# Patient Record
Sex: MALE | Race: WHITE | URBAN - METROPOLITAN AREA
[De-identification: names, ages, dates, MRNs, and addresses within clinical notes are randomized per-mention and may not be internally consistent; named-entity substitution may affect disease eponyms.]

---

## 2022-12-24 ENCOUNTER — APPOINTMENT (OUTPATIENT)
Dept: GENERAL RADIOLOGY | Age: 52
End: 2022-12-24
Payer: COMMERCIAL

## 2022-12-24 ENCOUNTER — HOSPITAL ENCOUNTER (EMERGENCY)
Age: 52
Discharge: HOME | End: 2022-12-24
Attending: EMERGENCY MEDICINE
Payer: COMMERCIAL

## 2022-12-24 VITALS
SYSTOLIC BLOOD PRESSURE: 126 MMHG | HEIGHT: 73 IN | DIASTOLIC BLOOD PRESSURE: 64 MMHG | OXYGEN SATURATION: 94 % | BODY MASS INDEX: 25.18 KG/M2 | WEIGHT: 190 LBS | TEMPERATURE: 98.8 F | HEART RATE: 80 BPM | RESPIRATION RATE: 14 BRPM

## 2022-12-24 DIAGNOSIS — R19.7 NAUSEA, VOMITING AND DIARRHEA: ICD-10-CM

## 2022-12-24 DIAGNOSIS — R11.2 NAUSEA, VOMITING AND DIARRHEA: ICD-10-CM

## 2022-12-24 DIAGNOSIS — E86.0 MILD DEHYDRATION: ICD-10-CM

## 2022-12-24 DIAGNOSIS — R55 SYNCOPE AND COLLAPSE: ICD-10-CM

## 2022-12-24 DIAGNOSIS — J10.1 INFLUENZA A: Primary | ICD-10-CM

## 2022-12-24 LAB
ALBUMIN SERPL-MCNC: 3.9 G/DL (ref 3.5–5.2)
ALP BLD-CCNC: 63 U/L (ref 40–129)
ALT SERPL-CCNC: 34 U/L (ref 0–40)
ANION GAP SERPL CALCULATED.3IONS-SCNC: 11 MMOL/L (ref 7–16)
AST SERPL-CCNC: 47 U/L (ref 0–39)
BASOPHILS ABSOLUTE: 0.01 E9/L (ref 0–0.2)
BASOPHILS RELATIVE PERCENT: 0.1 % (ref 0–2)
BILIRUB SERPL-MCNC: 0.4 MG/DL (ref 0–1.2)
BUN BLDV-MCNC: 14 MG/DL (ref 6–20)
CALCIUM SERPL-MCNC: 8.6 MG/DL (ref 8.6–10.2)
CHLORIDE BLD-SCNC: 101 MMOL/L (ref 98–107)
CO2: 25 MMOL/L (ref 22–29)
CREAT SERPL-MCNC: 1.2 MG/DL (ref 0.7–1.2)
EOSINOPHILS ABSOLUTE: 0 E9/L (ref 0.05–0.5)
EOSINOPHILS RELATIVE PERCENT: 0 % (ref 0–6)
GFR SERPL CREATININE-BSD FRML MDRD: >60 ML/MIN/1.73
GLUCOSE BLD-MCNC: 197 MG/DL (ref 74–99)
HCT VFR BLD CALC: 40.4 % (ref 37–54)
HEMOGLOBIN: 13.8 G/DL (ref 12.5–16.5)
IMMATURE GRANULOCYTES #: 0.03 E9/L
IMMATURE GRANULOCYTES %: 0.4 % (ref 0–5)
INFLUENZA A BY PCR: DETECTED
INFLUENZA B BY PCR: NOT DETECTED
LACTIC ACID: 1.6 MMOL/L (ref 0.5–2.2)
LIPASE: 48 U/L (ref 13–60)
LYMPHOCYTES ABSOLUTE: 1.01 E9/L (ref 1.5–4)
LYMPHOCYTES RELATIVE PERCENT: 13.2 % (ref 20–42)
MAGNESIUM: 1.6 MG/DL (ref 1.6–2.6)
MCH RBC QN AUTO: 31.2 PG (ref 26–35)
MCHC RBC AUTO-ENTMCNC: 34.2 % (ref 32–34.5)
MCV RBC AUTO: 91.2 FL (ref 80–99.9)
MONOCYTES ABSOLUTE: 0.67 E9/L (ref 0.1–0.95)
MONOCYTES RELATIVE PERCENT: 8.7 % (ref 2–12)
NEUTROPHILS ABSOLUTE: 5.95 E9/L (ref 1.8–7.3)
NEUTROPHILS RELATIVE PERCENT: 77.6 % (ref 43–80)
PDW BLD-RTO: 12.1 FL (ref 11.5–15)
PLATELET # BLD: 126 E9/L (ref 130–450)
PMV BLD AUTO: 9.5 FL (ref 7–12)
POTASSIUM SERPL-SCNC: 3.8 MMOL/L (ref 3.5–5)
PRO-BNP: 162 PG/ML (ref 0–125)
RBC # BLD: 4.43 E12/L (ref 3.8–5.8)
SARS-COV-2, NAAT: NOT DETECTED
SODIUM BLD-SCNC: 137 MMOL/L (ref 132–146)
TOTAL PROTEIN: 6.8 G/DL (ref 6.4–8.3)
TROPONIN, HIGH SENSITIVITY: 11 NG/L (ref 0–11)
WBC # BLD: 7.7 E9/L (ref 4.5–11.5)

## 2022-12-24 PROCEDURE — 96374 THER/PROPH/DIAG INJ IV PUSH: CPT

## 2022-12-24 PROCEDURE — 2580000003 HC RX 258: Performed by: STUDENT IN AN ORGANIZED HEALTH CARE EDUCATION/TRAINING PROGRAM

## 2022-12-24 PROCEDURE — 99285 EMERGENCY DEPT VISIT HI MDM: CPT

## 2022-12-24 PROCEDURE — 6360000002 HC RX W HCPCS: Performed by: STUDENT IN AN ORGANIZED HEALTH CARE EDUCATION/TRAINING PROGRAM

## 2022-12-24 PROCEDURE — 85025 COMPLETE CBC W/AUTO DIFF WBC: CPT

## 2022-12-24 PROCEDURE — 71045 X-RAY EXAM CHEST 1 VIEW: CPT

## 2022-12-24 PROCEDURE — 83605 ASSAY OF LACTIC ACID: CPT

## 2022-12-24 PROCEDURE — 96375 TX/PRO/DX INJ NEW DRUG ADDON: CPT

## 2022-12-24 PROCEDURE — 96361 HYDRATE IV INFUSION ADD-ON: CPT

## 2022-12-24 PROCEDURE — 93005 ELECTROCARDIOGRAM TRACING: CPT | Performed by: STUDENT IN AN ORGANIZED HEALTH CARE EDUCATION/TRAINING PROGRAM

## 2022-12-24 PROCEDURE — 6370000000 HC RX 637 (ALT 250 FOR IP): Performed by: STUDENT IN AN ORGANIZED HEALTH CARE EDUCATION/TRAINING PROGRAM

## 2022-12-24 PROCEDURE — 87502 INFLUENZA DNA AMP PROBE: CPT

## 2022-12-24 PROCEDURE — 84484 ASSAY OF TROPONIN QUANT: CPT

## 2022-12-24 PROCEDURE — 87635 SARS-COV-2 COVID-19 AMP PRB: CPT

## 2022-12-24 PROCEDURE — 83690 ASSAY OF LIPASE: CPT

## 2022-12-24 PROCEDURE — 83880 ASSAY OF NATRIURETIC PEPTIDE: CPT

## 2022-12-24 PROCEDURE — 80053 COMPREHEN METABOLIC PANEL: CPT

## 2022-12-24 PROCEDURE — 83735 ASSAY OF MAGNESIUM: CPT

## 2022-12-24 RX ORDER — KETOROLAC TROMETHAMINE 15 MG/ML
15 INJECTION, SOLUTION INTRAMUSCULAR; INTRAVENOUS ONCE
Status: COMPLETED | OUTPATIENT
Start: 2022-12-24 | End: 2022-12-24

## 2022-12-24 RX ORDER — ONDANSETRON 4 MG/1
8 TABLET, ORALLY DISINTEGRATING ORAL 3 TIMES DAILY PRN
Qty: 30 TABLET | Refills: 0 | Status: SHIPPED | OUTPATIENT
Start: 2022-12-24 | End: 2022-12-31

## 2022-12-24 RX ORDER — 0.9 % SODIUM CHLORIDE 0.9 %
1000 INTRAVENOUS SOLUTION INTRAVENOUS ONCE
Status: COMPLETED | OUTPATIENT
Start: 2022-12-24 | End: 2022-12-24

## 2022-12-24 RX ORDER — ONDANSETRON 2 MG/ML
4 INJECTION INTRAMUSCULAR; INTRAVENOUS ONCE
Status: COMPLETED | OUTPATIENT
Start: 2022-12-24 | End: 2022-12-24

## 2022-12-24 RX ORDER — ACETAMINOPHEN 500 MG
1000 TABLET ORAL ONCE
Status: COMPLETED | OUTPATIENT
Start: 2022-12-24 | End: 2022-12-24

## 2022-12-24 RX ORDER — NAPROXEN 500 MG/1
500 TABLET ORAL 2 TIMES DAILY PRN
Qty: 20 TABLET | Refills: 0 | Status: SHIPPED | OUTPATIENT
Start: 2022-12-24 | End: 2023-01-03

## 2022-12-24 RX ADMIN — SODIUM CHLORIDE 1000 ML: 9 INJECTION, SOLUTION INTRAVENOUS at 20:06

## 2022-12-24 RX ADMIN — SODIUM CHLORIDE 1000 ML: 9 INJECTION, SOLUTION INTRAVENOUS at 18:06

## 2022-12-24 RX ADMIN — ACETAMINOPHEN 1000 MG: 500 TABLET ORAL at 18:06

## 2022-12-24 RX ADMIN — ONDANSETRON 4 MG: 2 INJECTION INTRAMUSCULAR; INTRAVENOUS at 18:06

## 2022-12-24 RX ADMIN — KETOROLAC TROMETHAMINE 15 MG: 15 INJECTION, SOLUTION INTRAMUSCULAR; INTRAVENOUS at 19:50

## 2022-12-24 ASSESSMENT — PAIN - FUNCTIONAL ASSESSMENT: PAIN_FUNCTIONAL_ASSESSMENT: NONE - DENIES PAIN

## 2022-12-24 NOTE — ED PROVIDER NOTES
Department of Emergency Medicine   ED Provider Note  Admit Date/RoomTime: 12/24/2022  5:16 PM  ED Room: 08/08          History of Present Illness:  12/24/22, Time: 5:18 PM FREDIS Ferguson is a 47 y.o. male with history of tobacco use presenting to the ED for cough, fevers, diarrhea and nausea/vomiting, beginning 3 days ago. The complaint has been persistent, moderate in severity, and worsened by nothing. Patient states that intermittently for the past 3 weeks he has had viral symptoms, for the past 3 days has had fevers, dry cough, nausea and vomiting and diarrhea. He has had some intermittent dark stools but has been taking Pepto-Bismol. No hematochezia. No hematemesis. Due to the nausea/vomiting he has not been eating or drinking much. He has had subjective fevers, been taking over-the-counter medications for this. He has had some shortness of breath as well, worse with exertion. He came in today because he was getting up from bed, was feeling sweaty and not well, when he stood up to go to the bathroom he had an episode of syncope that was witnessed by his wife. He did hit his head on the washer machine, no seizure-like activity. Not on anticoagulation. No headache or visual changes or nausea/vomiting since the head injury. No neck pain. No abdominal pain. No known ill contacts. No chest pain. No dysuria or hematuria urgency or frequency. Review of Systems:      Pertinent positives and negatives are stated within HPI. 10 point ROS otherwise negative.    --------------------------------------------- PAST HISTORY ---------------------------------------------  Past Medical History:  has no past medical history on file. Past Surgical History:  has no past surgical history on file. Social History:  reports that he has been smoking cigarettes. He has a 55.50 pack-year smoking history. He does not have any smokeless tobacco history on file.     Family History: family history is not on file.     The patients home medications have been reviewed. Allergies: Patient has no known allergies. ---------------------------------------------------PHYSICAL EXAM--------------------------------------    Constitutional/General: Awake and alert, NAD, no labored breathing  Head: Normocephalic and atraumatic  Eyes: EOMI, conjunctiva normal, sclera non icteric  Ears: Normal external ears  Nose: Normal external nose  Mouth: Moist mucous membranes, uvula midline  Neck: Supple, no stridor, no meningeal signs  Respiratory: Lungs clear to auscultation bilaterally, no wheezes, rales, or rhonchi. Not in respiratory distress  Cardiovascular:  Regular rate. Regular rhythm. No murmurs, no gallops, or rubs. 2+ distal pulses. Equal extremity pulses. Chest: No chest wall tenderness or deformity  GI:  Abdomen Soft, Non tender, Non distended. No rebound, guarding, or rigidity. No pulsatile masses. Musculoskeletal: Moves all extremities x 4. Warm and well perfused, no clubbing, cyanosis, or edema. Capillary refill <3 seconds  Integument: skin warm and dry. Neurologic: GCS 15, no focal deficits, alert and responsive, symmetric strength 5/5 in the major muscle groups of upper and lower extremities bilaterally  Psychiatric: Normal Affect      -------------------------------------------------- RESULTS -------------------------------------------------  I have personally reviewed and interpreted all laboratory and imaging results for this patient. Results are listed below.      LABS:  Results for orders placed or performed during the hospital encounter of 12/24/22   COVID-19, Rapid    Specimen: Nasopharyngeal Swab   Result Value Ref Range    SARS-CoV-2, NAAT Not Detected Not Detected   RAPID INFLUENZA A/B ANTIGENS    Specimen: Nasopharyngeal   Result Value Ref Range    Influenza A by PCR DETECTED (A) Not Detected    Influenza B by PCR Not Detected Not Detected   CBC with Auto Differential   Result Value Ref Range    WBC 7.7 4.5 - 11.5 E9/L    RBC 4.43 3.80 - 5.80 E12/L    Hemoglobin 13.8 12.5 - 16.5 g/dL    Hematocrit 40.4 37.0 - 54.0 %    MCV 91.2 80.0 - 99.9 fL    MCH 31.2 26.0 - 35.0 pg    MCHC 34.2 32.0 - 34.5 %    RDW 12.1 11.5 - 15.0 fL    Platelets 366 (L) 233 - 450 E9/L    MPV 9.5 7.0 - 12.0 fL    Neutrophils % 77.6 43.0 - 80.0 %    Immature Granulocytes % 0.4 0.0 - 5.0 %    Lymphocytes % 13.2 (L) 20.0 - 42.0 %    Monocytes % 8.7 2.0 - 12.0 %    Eosinophils % 0.0 0.0 - 6.0 %    Basophils % 0.1 0.0 - 2.0 %    Neutrophils Absolute 5.95 1.80 - 7.30 E9/L    Immature Granulocytes # 0.03 E9/L    Lymphocytes Absolute 1.01 (L) 1.50 - 4.00 E9/L    Monocytes Absolute 0.67 0.10 - 0.95 E9/L    Eosinophils Absolute 0.00 (L) 0.05 - 0.50 E9/L    Basophils Absolute 0.01 0.00 - 0.20 E9/L   Lipase   Result Value Ref Range    Lipase 48 13 - 60 U/L   Magnesium   Result Value Ref Range    Magnesium 1.6 1.6 - 2.6 mg/dL   Lactic Acid   Result Value Ref Range    Lactic Acid 1.6 0.5 - 2.2 mmol/L   Troponin   Result Value Ref Range    Troponin, High Sensitivity 11 0 - 11 ng/L   Brain Natriuretic Peptide   Result Value Ref Range    Pro- (H) 0 - 125 pg/mL   CMP   Result Value Ref Range    Sodium 137 132 - 146 mmol/L    Potassium 3.8 3.5 - 5.0 mmol/L    Chloride 101 98 - 107 mmol/L    CO2 25 22 - 29 mmol/L    Anion Gap 11 7 - 16 mmol/L    Glucose 197 (H) 74 - 99 mg/dL    BUN 14 6 - 20 mg/dL    Creatinine 1.2 0.7 - 1.2 mg/dL    Est, Glom Filt Rate >60 >=60 mL/min/1.73    Calcium 8.6 8.6 - 10.2 mg/dL    Total Protein 6.8 6.4 - 8.3 g/dL    Albumin 3.9 3.5 - 5.2 g/dL    Total Bilirubin 0.4 0.0 - 1.2 mg/dL    Alkaline Phosphatase 63 40 - 129 U/L    ALT 34 0 - 40 U/L    AST 47 (H) 0 - 39 U/L   EKG 12 Lead   Result Value Ref Range    Ventricular Rate 94 BPM    Atrial Rate 94 BPM    P-R Interval 152 ms    QRS Duration 92 ms    Q-T Interval 336 ms    QTc Calculation (Bazett) 420 ms    P Axis 78 degrees    R Axis 83 degrees    T Axis 70 degrees RADIOLOGY:  Imaging Interpreted by Radiologist, initial wet read of imaging interpreted by myself  XR CHEST PORTABLE   Final Result   No acute process. EKG:    See ED Course for EKG documentation        ------------------------- NURSING NOTES AND VITALS REVIEWED ---------------------------   The nursing notes within the ED encounter and vital signs as below have been reviewed by myself. /64   Pulse 80   Temp 98.8 °F (37.1 °C) (Oral)   Resp 14   Ht 6' 1\" (1.854 m)   Wt 190 lb (86.2 kg)   SpO2 94%   BMI 25.07 kg/m²   Oxygen Saturation Interpretation: Normal    The patients available past medical records and past encounters were reviewed, see [facility] for details. ------------------------------ ED COURSE/MEDICAL DECISION MAKING----------------------  Medications   0.9 % sodium chloride bolus (0 mLs IntraVENous Stopped 12/24/22 2006)   ondansetron (ZOFRAN) injection 4 mg (4 mg IntraVENous Given 12/24/22 1806)   acetaminophen (TYLENOL) tablet 1,000 mg (1,000 mg Oral Given 12/24/22 1806)   0.9 % sodium chloride bolus (0 mLs IntraVENous Stopped 12/24/22 2113)   ketorolac (TORADOL) injection 15 mg (15 mg IntraVENous Given 12/24/22 1950)            Medical Decision Making:     ED Course as of 12/25/22 0053   Sat Dec 24, 2022   1825 EKG: This EKG is signed and interpreted by me. Rate: 94  Rhythm: Sinus  Interpretation: no acute changes, no acute ischemic changes  Comparison: no previous EKG   [RH]   2005 Patient maintained O2 sat of 93-94% on room air while ambulating. [RH]      ED Course User Index  [RH] [address], DO       This is a 53-year-old male presenting to the emergency department for lightheadedness, syncopal episode. Patient has also had cough and fever for the last several days. Patient here positive for influenza A. He has had decreased p.o. intake secondary to nausea and vomiting and diarrhea. Suspect mild dehydration.   Patient here with reassuring work-up including normal kidney function electrolytes, no leukocytosis or significant anemia. Patient normotensive and afebrile not tachycardic and nontoxic-appearing. Patient maintained O2 sat on room air while ambulating here in the emergency department. Patient is out of window for benefit from Tamiflu. Chest x-ray with no evidence of pneumonia or acute intrathoracic process. Troponin negative, EKG without acute ischemic changes. Patient treated with IV fluids, IV antiemetics, oral Tylenol for fever as well as IV Toradol for fever and myalgias. Patient with resolution of fever after treatment. Patient with significant improvement in symptoms after treatment with medications. Patient comfortable with plan for discharge home and outpatient follow-up, given prescription for antiemetics and NSAIDs, strict return precautions. See ED COURSE for additional MDM. Labs & imaging reviewed and interpreted, see RESULTS above. Re-Evaluations:           See ED Course above. This patient's ED course included: a personal history and physicial examination, re-evaluation prior to disposition, multiple bedside re-evaluations, IV medications, cardiac monitoring, and continuous pulse oximetry    This patient has improved during their ED course. Consultations:  See ED Course    Counseling: The emergency physician has spoken with the patient and spouse/SO and discussed todays results, in addition to providing specific details for the plan of care and counseling regarding the diagnosis and prognosis. Questions are answered at this time and they are agreeable with the plan.       --------------------------------- IMPRESSION AND DISPOSITION ---------------------------------    IMPRESSION  1. Influenza A    2. Syncope and collapse    3. Nausea, vomiting and diarrhea    4.  Mild dehydration        DISPOSITION  Disposition: Discharge to home  Patient condition is stable    NOTE: This report was transcribed using voice recognition software. Every effort was made to ensure accuracy; however, inadvertent computerized transcription errors may be present. Also please note that patient was seen and examined by attending physician. Plan of care and disposition discussed with attending physician and they were immediately available or present for all procedures performed.        -- Mckenna Fontanez D.O. PGY-3     Emergency Medicine      12/25/2022 12:53 AM         [address] DO  Resident  12/25/22 7622

## 2022-12-25 LAB
EKG ATRIAL RATE: 94 BPM
EKG P AXIS: 78 DEGREES
EKG P-R INTERVAL: 152 MS
EKG Q-T INTERVAL: 336 MS
EKG QRS DURATION: 92 MS
EKG QTC CALCULATION (BAZETT): 420 MS
EKG R AXIS: 83 DEGREES
EKG T AXIS: 70 DEGREES
EKG VENTRICULAR RATE: 94 BPM

## 2022-12-25 PROCEDURE — 93010 ELECTROCARDIOGRAM REPORT: CPT | Performed by: INTERNAL MEDICINE

## 2022-12-25 NOTE — ED NOTES
Patient ambulated on pulse ox. Spo2 remained 94% on room air.  Dr. Jl Hough notified      Rito Colorado RN  12/24/22 2008

## 2022-12-28 ENCOUNTER — APPOINTMENT (OUTPATIENT)
Dept: GENERAL RADIOLOGY | Age: 52
DRG: 871 | End: 2022-12-28
Payer: COMMERCIAL

## 2022-12-28 ENCOUNTER — APPOINTMENT (OUTPATIENT)
Dept: CT IMAGING | Age: 52
DRG: 871 | End: 2022-12-28
Payer: COMMERCIAL

## 2022-12-28 ENCOUNTER — HOSPITAL ENCOUNTER (INPATIENT)
Age: 52
LOS: 8 days | Discharge: HOME | DRG: 871 | End: 2023-01-05
Attending: EMERGENCY MEDICINE | Admitting: INTERNAL MEDICINE
Payer: COMMERCIAL

## 2022-12-28 DIAGNOSIS — J18.9 COMMUNITY ACQUIRED PNEUMONIA, UNSPECIFIED LATERALITY: ICD-10-CM

## 2022-12-28 DIAGNOSIS — J11.1 INFLUENZA WITH RESPIRATORY MANIFESTATION OTHER THAN PNEUMONIA: ICD-10-CM

## 2022-12-28 DIAGNOSIS — J44.1 COPD EXACERBATION (HCC): ICD-10-CM

## 2022-12-28 DIAGNOSIS — R06.03 ACUTE RESPIRATORY DISTRESS: ICD-10-CM

## 2022-12-28 DIAGNOSIS — J96.01 ACUTE RESPIRATORY FAILURE WITH HYPOXIA (HCC): Primary | ICD-10-CM

## 2022-12-28 LAB
ALBUMIN SERPL-MCNC: 2.9 G/DL (ref 3.5–5.2)
ALBUMIN SERPL-MCNC: 2.9 G/DL (ref 3.5–5.2)
ALP BLD-CCNC: 44 U/L (ref 40–129)
ALP BLD-CCNC: 44 U/L (ref 40–129)
ALT SERPL-CCNC: 16 U/L (ref 0–40)
ALT SERPL-CCNC: 16 U/L (ref 0–40)
ANION GAP SERPL CALCULATED.3IONS-SCNC: 18 MMOL/L (ref 7–16)
ANION GAP SERPL CALCULATED.3IONS-SCNC: 18 MMOL/L (ref 7–16)
AST SERPL-CCNC: 55 U/L (ref 0–39)
AST SERPL-CCNC: 55 U/L (ref 0–39)
B.E.: -1.3 MMOL/L (ref -3–3)
B.E.: -1.3 MMOL/L (ref -3–3)
BACTERIA: ABNORMAL /HPF
BACTERIA: ABNORMAL /HPF
BASOPHILS ABSOLUTE: 0 E9/L (ref 0–0.2)
BASOPHILS ABSOLUTE: 0 E9/L (ref 0–0.2)
BASOPHILS RELATIVE PERCENT: 0 % (ref 0–2)
BASOPHILS RELATIVE PERCENT: 0 % (ref 0–2)
BILIRUB SERPL-MCNC: 0.4 MG/DL (ref 0–1.2)
BILIRUB SERPL-MCNC: 0.4 MG/DL (ref 0–1.2)
BILIRUBIN URINE: NEGATIVE
BILIRUBIN URINE: NEGATIVE
BLOOD, URINE: NEGATIVE
BLOOD, URINE: NEGATIVE
BUN BLDV-MCNC: 19 MG/DL (ref 6–20)
BUN BLDV-MCNC: 19 MG/DL (ref 6–20)
BURR CELLS: ABNORMAL
BURR CELLS: ABNORMAL
CALCIUM SERPL-MCNC: 8.3 MG/DL (ref 8.6–10.2)
CALCIUM SERPL-MCNC: 8.3 MG/DL (ref 8.6–10.2)
CHLORIDE BLD-SCNC: 97 MMOL/L (ref 98–107)
CHLORIDE BLD-SCNC: 97 MMOL/L (ref 98–107)
CHP ED QC CHECK: NORMAL
CHP ED QC CHECK: NORMAL
CLARITY: CLEAR
CLARITY: CLEAR
CO2: 19 MMOL/L (ref 22–29)
CO2: 19 MMOL/L (ref 22–29)
COHB: 1.6 % (ref 0–1.5)
COHB: 1.6 % (ref 0–1.5)
COLOR: YELLOW
COLOR: YELLOW
CREAT SERPL-MCNC: 0.9 MG/DL (ref 0.7–1.2)
CREAT SERPL-MCNC: 0.9 MG/DL (ref 0.7–1.2)
CRITICAL: ABNORMAL
CRITICAL: ABNORMAL
DATE ANALYZED: ABNORMAL
DATE ANALYZED: ABNORMAL
DATE OF COLLECTION: ABNORMAL
DATE OF COLLECTION: ABNORMAL
EOSINOPHILS ABSOLUTE: 0 E9/L (ref 0.05–0.5)
EOSINOPHILS ABSOLUTE: 0 E9/L (ref 0.05–0.5)
EOSINOPHILS RELATIVE PERCENT: 0 % (ref 0–6)
EOSINOPHILS RELATIVE PERCENT: 0 % (ref 0–6)
EPITHELIAL CELLS, UA: ABNORMAL /HPF
EPITHELIAL CELLS, UA: ABNORMAL /HPF
GFR SERPL CREATININE-BSD FRML MDRD: >60 ML/MIN/1.73
GFR SERPL CREATININE-BSD FRML MDRD: >60 ML/MIN/1.73
GLUCOSE BLD-MCNC: 149 MG/DL (ref 74–99)
GLUCOSE BLD-MCNC: 149 MG/DL (ref 74–99)
GLUCOSE BLD-MCNC: 372 MG/DL
GLUCOSE BLD-MCNC: 372 MG/DL
GLUCOSE URINE: >=1000 MG/DL
GLUCOSE URINE: >=1000 MG/DL
HCO3: 20.7 MMOL/L (ref 22–26)
HCO3: 20.7 MMOL/L (ref 22–26)
HCT VFR BLD CALC: 36.6 % (ref 37–54)
HCT VFR BLD CALC: 36.6 % (ref 37–54)
HEMOGLOBIN: 13.3 G/DL (ref 12.5–16.5)
HEMOGLOBIN: 13.3 G/DL (ref 12.5–16.5)
HHB: 24 % (ref 0–5)
HHB: 24 % (ref 0–5)
INFLUENZA A: DETECTED
INFLUENZA A: DETECTED
INFLUENZA B: NOT DETECTED
INFLUENZA B: NOT DETECTED
INR BLD: 1.2
INR BLD: 1.2
KETONES, URINE: 15 MG/DL
KETONES, URINE: 15 MG/DL
LAB: ABNORMAL
LAB: ABNORMAL
LACTIC ACID, SEPSIS: 2.4 MMOL/L (ref 0.5–1.9)
LACTIC ACID, SEPSIS: 2.4 MMOL/L (ref 0.5–1.9)
LACTIC ACID, SEPSIS: 2.8 MMOL/L (ref 0.5–1.9)
LACTIC ACID, SEPSIS: 2.8 MMOL/L (ref 0.5–1.9)
LEUKOCYTE ESTERASE, URINE: NEGATIVE
LEUKOCYTE ESTERASE, URINE: NEGATIVE
LIPASE: 24 U/L (ref 13–60)
LIPASE: 24 U/L (ref 13–60)
LYMPHOCYTES ABSOLUTE: 0.24 E9/L (ref 1.5–4)
LYMPHOCYTES ABSOLUTE: 0.24 E9/L (ref 1.5–4)
LYMPHOCYTES RELATIVE PERCENT: 3.5 % (ref 20–42)
LYMPHOCYTES RELATIVE PERCENT: 3.5 % (ref 20–42)
Lab: ABNORMAL
Lab: ABNORMAL
MCH RBC QN AUTO: 31.5 PG (ref 26–35)
MCH RBC QN AUTO: 31.5 PG (ref 26–35)
MCHC RBC AUTO-ENTMCNC: 36.3 % (ref 32–34.5)
MCHC RBC AUTO-ENTMCNC: 36.3 % (ref 32–34.5)
MCV RBC AUTO: 86.7 FL (ref 80–99.9)
MCV RBC AUTO: 86.7 FL (ref 80–99.9)
METER GLUCOSE: 372 MG/DL (ref 74–99)
METER GLUCOSE: 372 MG/DL (ref 74–99)
METHB: 0.3 % (ref 0–1.5)
METHB: 0.3 % (ref 0–1.5)
MODE: ABNORMAL
MODE: ABNORMAL
MONOCYTES ABSOLUTE: 0.3 E9/L (ref 0.1–0.95)
MONOCYTES ABSOLUTE: 0.3 E9/L (ref 0.1–0.95)
MONOCYTES RELATIVE PERCENT: 5.2 % (ref 2–12)
MONOCYTES RELATIVE PERCENT: 5.2 % (ref 2–12)
NEUTROPHILS ABSOLUTE: 5.37 E9/L (ref 1.8–7.3)
NEUTROPHILS ABSOLUTE: 5.37 E9/L (ref 1.8–7.3)
NEUTROPHILS RELATIVE PERCENT: 91.3 % (ref 43–80)
NEUTROPHILS RELATIVE PERCENT: 91.3 % (ref 43–80)
NITRITE, URINE: NEGATIVE
NITRITE, URINE: NEGATIVE
O2 CONTENT: 14.6 ML/DL
O2 CONTENT: 14.6 ML/DL
O2 SATURATION: 75.5 % (ref 92–98.5)
O2 SATURATION: 75.5 % (ref 92–98.5)
O2HB: 74.1 % (ref 94–97)
O2HB: 74.1 % (ref 94–97)
OPERATOR ID: 1893
OPERATOR ID: 1893
PATIENT TEMP: 37 C
PATIENT TEMP: 37 C
PCO2: 27.9 MMHG (ref 35–45)
PCO2: 27.9 MMHG (ref 35–45)
PDW BLD-RTO: 12.4 FL (ref 11.5–15)
PDW BLD-RTO: 12.4 FL (ref 11.5–15)
PH BLOOD GAS: 7.49 (ref 7.35–7.45)
PH BLOOD GAS: 7.49 (ref 7.35–7.45)
PH UA: 5.5 (ref 5–9)
PH UA: 5.5 (ref 5–9)
PLATELET # BLD: 79 E9/L (ref 130–450)
PLATELET # BLD: 79 E9/L (ref 130–450)
PLATELET CONFIRMATION: NORMAL
PLATELET CONFIRMATION: NORMAL
PMV BLD AUTO: 10.9 FL (ref 7–12)
PMV BLD AUTO: 10.9 FL (ref 7–12)
PO2: 37.9 MMHG (ref 75–100)
PO2: 37.9 MMHG (ref 75–100)
POIKILOCYTES: ABNORMAL
POIKILOCYTES: ABNORMAL
POTASSIUM REFLEX MAGNESIUM: 3.9 MMOL/L (ref 3.5–5)
POTASSIUM REFLEX MAGNESIUM: 3.9 MMOL/L (ref 3.5–5)
POTASSIUM SERPL-SCNC: 3.79 MMOL/L (ref 3.5–5)
POTASSIUM SERPL-SCNC: 3.79 MMOL/L (ref 3.5–5)
PROTEIN UA: 30 MG/DL
PROTEIN UA: 30 MG/DL
PROTHROMBIN TIME: 13.1 SEC (ref 9.3–12.4)
PROTHROMBIN TIME: 13.1 SEC (ref 9.3–12.4)
RBC # BLD: 4.22 E12/L (ref 3.8–5.8)
RBC # BLD: 4.22 E12/L (ref 3.8–5.8)
RBC UA: ABNORMAL /HPF (ref 0–2)
RBC UA: ABNORMAL /HPF (ref 0–2)
SARS-COV-2 RNA, RT PCR: NOT DETECTED
SARS-COV-2 RNA, RT PCR: NOT DETECTED
SODIUM BLD-SCNC: 134 MMOL/L (ref 132–146)
SODIUM BLD-SCNC: 134 MMOL/L (ref 132–146)
SOURCE, BLOOD GAS: ABNORMAL
SOURCE, BLOOD GAS: ABNORMAL
SPECIFIC GRAVITY UA: 1.01 (ref 1–1.03)
SPECIFIC GRAVITY UA: 1.01 (ref 1–1.03)
THB: 14.1 G/DL (ref 11.5–16.5)
THB: 14.1 G/DL (ref 11.5–16.5)
TIME ANALYZED: 1511
TIME ANALYZED: 1511
TOTAL PROTEIN: 6.6 G/DL (ref 6.4–8.3)
TOTAL PROTEIN: 6.6 G/DL (ref 6.4–8.3)
TROPONIN, HIGH SENSITIVITY: 13 NG/L (ref 0–11)
TROPONIN, HIGH SENSITIVITY: 13 NG/L (ref 0–11)
TROPONIN, HIGH SENSITIVITY: 16 NG/L (ref 0–11)
TROPONIN, HIGH SENSITIVITY: 16 NG/L (ref 0–11)
UROBILINOGEN, URINE: 0.2 E.U./DL
UROBILINOGEN, URINE: 0.2 E.U./DL
WBC # BLD: 5.9 E9/L (ref 4.5–11.5)
WBC # BLD: 5.9 E9/L (ref 4.5–11.5)
WBC UA: ABNORMAL /HPF (ref 0–5)
WBC UA: ABNORMAL /HPF (ref 0–5)

## 2022-12-28 PROCEDURE — 99285 EMERGENCY DEPT VISIT HI MDM: CPT

## 2022-12-28 PROCEDURE — 2060000000 HC ICU INTERMEDIATE R&B

## 2022-12-28 PROCEDURE — 82962 GLUCOSE BLOOD TEST: CPT

## 2022-12-28 PROCEDURE — 87449 NOS EACH ORGANISM AG IA: CPT

## 2022-12-28 PROCEDURE — 2500000003 HC RX 250 WO HCPCS

## 2022-12-28 PROCEDURE — 80053 COMPREHEN METABOLIC PANEL: CPT

## 2022-12-28 PROCEDURE — 84132 ASSAY OF SERUM POTASSIUM: CPT

## 2022-12-28 PROCEDURE — 87040 BLOOD CULTURE FOR BACTERIA: CPT

## 2022-12-28 PROCEDURE — 81001 URINALYSIS AUTO W/SCOPE: CPT

## 2022-12-28 PROCEDURE — 6360000002 HC RX W HCPCS

## 2022-12-28 PROCEDURE — 2580000003 HC RX 258

## 2022-12-28 PROCEDURE — 96367 TX/PROPH/DG ADDL SEQ IV INF: CPT

## 2022-12-28 PROCEDURE — 94660 CPAP INITIATION&MGMT: CPT

## 2022-12-28 PROCEDURE — 96375 TX/PRO/DX INJ NEW DRUG ADDON: CPT

## 2022-12-28 PROCEDURE — 71275 CT ANGIOGRAPHY CHEST: CPT

## 2022-12-28 PROCEDURE — 87636 SARSCOV2 & INF A&B AMP PRB: CPT

## 2022-12-28 PROCEDURE — 83605 ASSAY OF LACTIC ACID: CPT

## 2022-12-28 PROCEDURE — 82805 BLOOD GASES W/O2 SATURATION: CPT

## 2022-12-28 PROCEDURE — 6370000000 HC RX 637 (ALT 250 FOR IP)

## 2022-12-28 PROCEDURE — 86140 C-REACTIVE PROTEIN: CPT

## 2022-12-28 PROCEDURE — 85025 COMPLETE CBC W/AUTO DIFF WBC: CPT

## 2022-12-28 PROCEDURE — 94640 AIRWAY INHALATION TREATMENT: CPT

## 2022-12-28 PROCEDURE — 71045 X-RAY EXAM CHEST 1 VIEW: CPT

## 2022-12-28 PROCEDURE — 36415 COLL VENOUS BLD VENIPUNCTURE: CPT

## 2022-12-28 PROCEDURE — 0202U NFCT DS 22 TRGT SARS-COV-2: CPT

## 2022-12-28 PROCEDURE — 93005 ELECTROCARDIOGRAM TRACING: CPT

## 2022-12-28 PROCEDURE — 84484 ASSAY OF TROPONIN QUANT: CPT

## 2022-12-28 PROCEDURE — 96365 THER/PROPH/DIAG IV INF INIT: CPT

## 2022-12-28 PROCEDURE — 85610 PROTHROMBIN TIME: CPT

## 2022-12-28 PROCEDURE — 6360000004 HC RX CONTRAST MEDICATION: Performed by: RADIOLOGY

## 2022-12-28 PROCEDURE — 96366 THER/PROPH/DIAG IV INF ADDON: CPT

## 2022-12-28 PROCEDURE — 87088 URINE BACTERIA CULTURE: CPT

## 2022-12-28 PROCEDURE — 83690 ASSAY OF LIPASE: CPT

## 2022-12-28 RX ORDER — ENOXAPARIN SODIUM 100 MG/ML
40 INJECTION SUBCUTANEOUS DAILY
Status: DISCONTINUED | OUTPATIENT
Start: 2022-12-29 | End: 2023-01-05 | Stop reason: HOSPADM

## 2022-12-28 RX ORDER — SODIUM CHLORIDE 0.9 % (FLUSH) 0.9 %
5-40 SYRINGE (ML) INJECTION PRN
Status: DISCONTINUED | OUTPATIENT
Start: 2022-12-28 | End: 2023-01-05 | Stop reason: HOSPADM

## 2022-12-28 RX ORDER — OSELTAMIVIR PHOSPHATE 75 MG/1
75 CAPSULE ORAL 2 TIMES DAILY
Status: DISPENSED | OUTPATIENT
Start: 2022-12-28 | End: 2023-01-02

## 2022-12-28 RX ORDER — LANOLIN ALCOHOL/MO/W.PET/CERES
100 CREAM (GRAM) TOPICAL DAILY
Status: DISCONTINUED | OUTPATIENT
Start: 2022-12-29 | End: 2023-01-05 | Stop reason: HOSPADM

## 2022-12-28 RX ORDER — ONDANSETRON 4 MG/1
4 TABLET, ORALLY DISINTEGRATING ORAL EVERY 8 HOURS PRN
Status: DISCONTINUED | OUTPATIENT
Start: 2022-12-28 | End: 2023-01-05 | Stop reason: HOSPADM

## 2022-12-28 RX ORDER — SODIUM CHLORIDE 0.9 % (FLUSH) 0.9 %
5-40 SYRINGE (ML) INJECTION EVERY 12 HOURS SCHEDULED
Status: DISCONTINUED | OUTPATIENT
Start: 2022-12-28 | End: 2023-01-05 | Stop reason: HOSPADM

## 2022-12-28 RX ORDER — NICOTINE 21 MG/24HR
1 PATCH, TRANSDERMAL 24 HOURS TRANSDERMAL DAILY
Status: DISCONTINUED | OUTPATIENT
Start: 2022-12-29 | End: 2023-01-05 | Stop reason: HOSPADM

## 2022-12-28 RX ORDER — IPRATROPIUM BROMIDE AND ALBUTEROL SULFATE 2.5; .5 MG/3ML; MG/3ML
3 SOLUTION RESPIRATORY (INHALATION) ONCE
Status: COMPLETED | OUTPATIENT
Start: 2022-12-28 | End: 2022-12-28

## 2022-12-28 RX ORDER — DEXAMETHASONE SODIUM PHOSPHATE 10 MG/ML
10 INJECTION INTRAMUSCULAR; INTRAVENOUS ONCE
Status: COMPLETED | OUTPATIENT
Start: 2022-12-28 | End: 2022-12-28

## 2022-12-28 RX ORDER — ACETAMINOPHEN 325 MG/1
650 TABLET ORAL ONCE
Status: COMPLETED | OUTPATIENT
Start: 2022-12-28 | End: 2022-12-28

## 2022-12-28 RX ORDER — OSELTAMIVIR PHOSPHATE 75 MG/1
75 CAPSULE ORAL 2 TIMES DAILY
Status: DISCONTINUED | OUTPATIENT
Start: 2022-12-28 | End: 2022-12-28

## 2022-12-28 RX ORDER — NICOTINE 21 MG/24HR
1 PATCH, TRANSDERMAL 24 HOURS TRANSDERMAL ONCE
Status: COMPLETED | OUTPATIENT
Start: 2022-12-28 | End: 2022-12-29

## 2022-12-28 RX ORDER — MAGNESIUM SULFATE IN WATER 40 MG/ML
2000 INJECTION, SOLUTION INTRAVENOUS ONCE
Status: COMPLETED | OUTPATIENT
Start: 2022-12-28 | End: 2022-12-28

## 2022-12-28 RX ORDER — 0.9 % SODIUM CHLORIDE 0.9 %
1000 INTRAVENOUS SOLUTION INTRAVENOUS ONCE
Status: COMPLETED | OUTPATIENT
Start: 2022-12-28 | End: 2022-12-28

## 2022-12-28 RX ORDER — ACETAMINOPHEN 325 MG/1
650 TABLET ORAL EVERY 6 HOURS PRN
Status: DISCONTINUED | OUTPATIENT
Start: 2022-12-28 | End: 2023-01-05 | Stop reason: HOSPADM

## 2022-12-28 RX ORDER — FOLIC ACID 1 MG/1
1 TABLET ORAL DAILY
Status: DISCONTINUED | OUTPATIENT
Start: 2022-12-29 | End: 2023-01-05 | Stop reason: HOSPADM

## 2022-12-28 RX ORDER — POLYETHYLENE GLYCOL 3350 17 G/17G
17 POWDER, FOR SOLUTION ORAL DAILY PRN
Status: DISCONTINUED | OUTPATIENT
Start: 2022-12-28 | End: 2023-01-05 | Stop reason: HOSPADM

## 2022-12-28 RX ORDER — IPRATROPIUM BROMIDE AND ALBUTEROL SULFATE 2.5; .5 MG/3ML; MG/3ML
1 SOLUTION RESPIRATORY (INHALATION)
Status: DISCONTINUED | OUTPATIENT
Start: 2022-12-29 | End: 2023-01-05 | Stop reason: HOSPADM

## 2022-12-28 RX ORDER — INSULIN LISPRO 100 [IU]/ML
0-4 INJECTION, SOLUTION INTRAVENOUS; SUBCUTANEOUS
Status: DISCONTINUED | OUTPATIENT
Start: 2022-12-29 | End: 2022-12-29

## 2022-12-28 RX ORDER — INSULIN LISPRO 100 [IU]/ML
0-4 INJECTION, SOLUTION INTRAVENOUS; SUBCUTANEOUS NIGHTLY
Status: DISCONTINUED | OUTPATIENT
Start: 2022-12-28 | End: 2022-12-29

## 2022-12-28 RX ORDER — ONDANSETRON 2 MG/ML
4 INJECTION INTRAMUSCULAR; INTRAVENOUS EVERY 6 HOURS PRN
Status: DISCONTINUED | OUTPATIENT
Start: 2022-12-28 | End: 2023-01-05 | Stop reason: HOSPADM

## 2022-12-28 RX ORDER — SODIUM CHLORIDE 9 MG/ML
INJECTION, SOLUTION INTRAVENOUS PRN
Status: DISCONTINUED | OUTPATIENT
Start: 2022-12-28 | End: 2023-01-05 | Stop reason: HOSPADM

## 2022-12-28 RX ORDER — ACETAMINOPHEN 650 MG/1
650 SUPPOSITORY RECTAL EVERY 6 HOURS PRN
Status: DISCONTINUED | OUTPATIENT
Start: 2022-12-28 | End: 2023-01-05 | Stop reason: HOSPADM

## 2022-12-28 RX ADMIN — MAGNESIUM SULFATE IN WATER 2000 MG: 40 INJECTION, SOLUTION INTRAVENOUS at 15:43

## 2022-12-28 RX ADMIN — SODIUM CHLORIDE 1000 ML: 9 INJECTION, SOLUTION INTRAVENOUS at 15:42

## 2022-12-28 RX ADMIN — IOPAMIDOL 75 ML: 755 INJECTION, SOLUTION INTRAVENOUS at 16:12

## 2022-12-28 RX ADMIN — DOXYCYCLINE 100 MG: 100 INJECTION, POWDER, LYOPHILIZED, FOR SOLUTION INTRAVENOUS at 17:50

## 2022-12-28 RX ADMIN — SODIUM CHLORIDE 1000 ML: 9 INJECTION, SOLUTION INTRAVENOUS at 20:36

## 2022-12-28 RX ADMIN — CEFEPIME 2000 MG: 2 INJECTION, POWDER, FOR SOLUTION INTRAVENOUS at 16:59

## 2022-12-28 RX ADMIN — DEXAMETHASONE SODIUM PHOSPHATE 10 MG: 10 INJECTION INTRAMUSCULAR; INTRAVENOUS at 15:41

## 2022-12-28 RX ADMIN — IPRATROPIUM BROMIDE AND ALBUTEROL SULFATE 3 AMPULE: .5; 2.5 SOLUTION RESPIRATORY (INHALATION) at 16:39

## 2022-12-28 RX ADMIN — OSELTAMIVIR PHOSPHATE 75 MG: 75 CAPSULE ORAL at 20:35

## 2022-12-28 RX ADMIN — ACETAMINOPHEN 650 MG: 325 TABLET ORAL at 17:05

## 2022-12-28 ASSESSMENT — ENCOUNTER SYMPTOMS
EYE DISCHARGE: 0
EYE ITCHING: 0
ABDOMINAL PAIN: 0
APNEA: 1
SHORTNESS OF BREATH: 1
COLOR CHANGE: 0
BACK PAIN: 0
ABDOMINAL DISTENTION: 0
CHEST TIGHTNESS: 1

## 2022-12-28 ASSESSMENT — LIFESTYLE VARIABLES
HOW OFTEN DO YOU HAVE A DRINK CONTAINING ALCOHOL: 4 OR MORE TIMES A WEEK
HOW MANY STANDARD DRINKS CONTAINING ALCOHOL DO YOU HAVE ON A TYPICAL DAY: 3 OR 4

## 2022-12-28 NOTE — LETTER
[address] [place] [address]  Phone: [phone]             January 5, 2023    Patient: Shahram Bourgeois   YOB: 1970   Date of Visit: 12/28/2022       To Whom It May Concern:    Beto Carmona was seen and treated in our facility  beginning 12/28/2022 until 1/5/23}. He {Return to school/sport/work:66448}.       Sincerely,       Kelli Stevens RN         Signature:__________________________________

## 2022-12-28 NOTE — ED NOTES
Name: Kelsey Ferguson  : 1970  MRN: [id number]    Date: 2022    Benefits of immediately proceeding with Radiology exam outweigh the risks and therefore the following is being waived:      [] Pregnancy test    [] Protocol for Iodine allergy    [] MRI questionnaire    [x] BUN/Creatinine        MD Dejuan Lin MD  22 [id number]

## 2022-12-28 NOTE — ED PROVIDER NOTES
Samina Lucero 47 y.o. male PHMx of COPD, tobacco use disorder presents to the ED c/o respiratory distress, shortness of breath. Onset: Several weeks ago with then worse today. Location/Radiation: Generalized. Duration: Intermittent and then constant with progressively worsening. Characterization: Shortness of breath at rest and exertion. Aggravating Factors: Smoking, influenza. Relieving Factors: CPAP/BiPAP placed on EMS and then in the ED. Severity: Severe. Assx Sxs: Shortness of breath, tachycardia, fever. He Denies: Chest pain/palpitations, abdominal pain, nausea/vomiting, numbness/tingling, hemoptysis. Review of Systems   Constitutional:  Positive for activity change, chills and fever. Eyes:  Negative for discharge and itching. Respiratory:  Positive for apnea, chest tightness and shortness of breath. Cardiovascular:  Negative for chest pain, palpitations and leg swelling. Gastrointestinal:  Negative for abdominal distention and abdominal pain. Endocrine: Negative for cold intolerance and heat intolerance. Genitourinary:  Negative for dysuria and enuresis. Musculoskeletal:  Negative for arthralgias and back pain. Skin:  Negative for color change and pallor. Allergic/Immunologic: Negative for environmental allergies and food allergies. Neurological:  Negative for dizziness and facial asymmetry. Hematological:  Negative for adenopathy. Does not bruise/bleed easily. Psychiatric/Behavioral:  Negative for agitation and behavioral problems. Physical Exam  Constitutional:       Appearance: He is well-developed and normal weight. He is ill-appearing and diaphoretic. HENT:      Head: Normocephalic and atraumatic. Left Ear: External ear normal.      Mouth/Throat:      Mouth: Mucous membranes are moist.      Pharynx: Oropharynx is clear. Eyes:      Extraocular Movements: Extraocular movements intact. Pupils: Pupils are equal, round, and reactive to light. Cardiovascular:      Rate and Rhythm: Regular rhythm. Tachycardia present. Pulses: Normal pulses. Heart sounds: Normal heart sounds. Pulmonary:      Effort: Tachypnea and respiratory distress present. Breath sounds: Normal breath sounds. No wheezing, rhonchi or rales. Chest:      Chest wall: No mass, tenderness or edema. Abdominal:      General: There is no distension. Palpations: Abdomen is soft. Tenderness: There is no abdominal tenderness. Musculoskeletal:         General: No tenderness, deformity or signs of injury. Normal range of motion. Cervical back: Normal range of motion. Right lower leg: No tenderness. No edema. Left lower leg: No tenderness. No edema. Skin:     General: Skin is warm. Capillary Refill: Capillary refill takes less than 2 seconds. Coloration: Skin is pale. Skin is not jaundiced. Findings: No bruising. Neurological:      General: No focal deficit present. Mental Status: He is alert and oriented to person, place, and time. Cranial Nerves: No cranial nerve deficit. Motor: No weakness. Gait: Gait normal.   Psychiatric:         Behavior: Behavior normal.         Thought Content: Thought content normal.        Procedures     MDM  Number of Diagnoses or Management Options  Acute respiratory distress  Community acquired pneumonia, unspecified laterality  COPD exacerbation ([facility] [address]  Hypoxia  Influenza with respiratory manifestation other than pneumonia  Diagnosis management comments: This is a 23-year-old gentleman that presented to the ER from home via EMS with a chief complaint of acute respiratory distress and hypoxia. Patient has a history of flu positive and chronic tobacco smoker, does not have good follow-up with his PCP. Upon evaluation the patient he was acutely hypoxic and in respiratory distress on CPAP from EMS. EMS reports that they gave him 125 Solu-Medrol and a DuoNeb breathing treatment.   Upon initial evaluation physical exam patient had clear bilateral breath sounds, no accessory muscle use, no belly breathing, tachycardic, febrile, normotensive, cranial nerves II through XII grossly intact, 5 out of 5 motor strength, normal motor function, following commands, no confusion, he was protecting his airway and talking, AOx4. He was transitioned off the EMS CPAP and put on ER BiPAP. He tolerated this well. His oxygen saturation improved from 70% to greater than 90% once put on the BiPAP. He was immediately also given 2 g of magnesium and peripheral IVs.  Patient was then given another DuoNeb treatment and 10mg of IV Decadron. On multiple reevaluations patient is respiratory status improved. He did not require intubation during his ER stay. Laboratory evaluation was significant for influenza A, no leukocytosis, normal H&H, electrolytes including kidney function and sodium potassium were normal.  CTA pulmonary of contrast showed no evidence of PE. However, the CTA did show severe diffuse groundglass opacities in both lungs with relative sparing of bilateral lung apices, distributions fairly symmetric, and again no evidence of pulmonary embolism. Due to the patient's influenza and concern for commune acquired pneumonia he was given IV cefepime and doxycycline. Chest x-ray also showed bilateral lung opacities greater on the right than the left. He was given IV fluids, oral Tylenol as well to help treat fever. Urine cultures and blood cultures x2 were drawn before he was given IV antibiotics. Discussed case with house medicine they agreed to admit the patient for further evaluation work-up. Patient verbally consents agreed to the plan. Patient stable at time of admission.        Amount and/or Complexity of Data Reviewed  Clinical lab tests: reviewed  Tests in the radiology section of CPT®: reviewed  Tests in the medicine section of CPT®: reviewed         ED Course as of 12/28/22 1741   Wed Dec 28, 2022 1716 EKG: This EKG is signed and interpreted by EP. Rate: 93  Rhythm: Sinus  Interpretation: No ST segment elevation/depressions, no T wave abnormalities. Comparison: no previous EKG available  []      ED Course User Index  [] Fe Manriquez DO      ED Course as of 12/28/22 1741   Wed Dec 28, 2022   1716 EKG: This EKG is signed and interpreted by EP. Rate: 93  Rhythm: Sinus  Interpretation: No ST segment elevation/depressions, no T wave abnormalities. Comparison: no previous EKG available  []      ED Course User Index  [] Fe Manriquez DO       --------------------------------------------- PAST HISTORY ---------------------------------------------  Past Medical History:  has no past medical history on file. Past Surgical History:  has no past surgical history on file. Social History:  reports that he has been smoking cigarettes. He has been smoking an average of 1 pack per day. He has never used smokeless tobacco. He reports current alcohol use. He reports that he does not use drugs. Family History: family history is not on file. The patients home medications have been reviewed. Allergies: Patient has no known allergies.     -------------------------------------------------- RESULTS -------------------------------------------------    LABS:  Results for orders placed or performed during the hospital encounter of 12/28/22   COVID-19 & Influenza Combo    Specimen: Nasopharyngeal Swab   Result Value Ref Range    SARS-CoV-2 RNA, RT PCR NOT DETECTED NOT DETECTED    INFLUENZA A DETECTED (A) NOT DETECTED    INFLUENZA B NOT DETECTED NOT DETECTED   Blood Gas, Arterial   Result Value Ref Range    Date Analyzed 20221228     Time Analyzed 1511     Source: Blood Arterial     pH, Blood Gas 7.488 (H) 7.350 - 7.450    PCO2 27.9 (L) 35.0 - 45.0 mmHg    PO2 37.9 (LL) 75.0 - 100.0 mmHg    HCO3 20.7 (L) 22.0 - 26.0 mmol/L    B.E. -1.3 -3.0 - 3.0 mmol/L    O2 Sat 75.5 (L) 92.0 - 98.5 % O2Hb 74.1 (L) 94.0 - 97.0 %    COHb 1.6 (H) 0.0 - 1.5 %    MetHb 0.3 0.0 - 1.5 %    O2 Content 14.6 mL/dL    HHb 24.0 (H) 0.0 - 5.0 %    tHb (est) 14.1 11.5 - 16.5 g/dL    Potassium 3.79 3.50 - 5.00 mmol/L    Mode ems cpap 15L     Date Of Collection      Time Collected      Pt Temp 37.0 C     ID 1893     Lab [id number]     Critical(s) Notified Handed report to Dr/RN    Lactate, Sepsis   Result Value Ref Range    Lactic Acid, Sepsis 2.8 (H) 0.5 - 1.9 mmol/L   CBC with Auto Differential   Result Value Ref Range    WBC 5.9 4.5 - 11.5 E9/L    RBC 4.22 3.80 - 5.80 E12/L    Hemoglobin 13.3 12.5 - 16.5 g/dL    Hematocrit 36.6 (L) 37.0 - 54.0 %    MCV 86.7 80.0 - 99.9 fL    MCH 31.5 26.0 - 35.0 pg    MCHC 36.3 (H) 32.0 - 34.5 %    RDW 12.4 11.5 - 15.0 fL    Platelets 79 (L) 309 - 450 E9/L    MPV 10.9 7.0 - 12.0 fL   Comprehensive Metabolic Panel w/ Reflex to MG   Result Value Ref Range    Sodium 134 132 - 146 mmol/L    Potassium reflex Magnesium 3.9 3.5 - 5.0 mmol/L    Chloride 97 (L) 98 - 107 mmol/L    CO2 19 (L) 22 - 29 mmol/L    Anion Gap 18 (H) 7 - 16 mmol/L    Glucose 149 (H) 74 - 99 mg/dL    BUN 19 6 - 20 mg/dL    Creatinine 0.9 0.7 - 1.2 mg/dL    Est, Glom Filt Rate >60 >=60 mL/min/1.73    Calcium 8.3 (L) 8.6 - 10.2 mg/dL    Total Protein 6.6 6.4 - 8.3 g/dL    Albumin 2.9 (L) 3.5 - 5.2 g/dL    Total Bilirubin 0.4 0.0 - 1.2 mg/dL    Alkaline Phosphatase 44 40 - 129 U/L    ALT 16 0 - 40 U/L    AST 55 (H) 0 - 39 U/L   Protime-INR   Result Value Ref Range    Protime 13.1 (H) 9.3 - 12.4 sec    INR 1.2    Lipase   Result Value Ref Range    Lipase 24 13 - 60 U/L   Troponin   Result Value Ref Range    Troponin, High Sensitivity 13 (H) 0 - 11 ng/L   EKG 12 Lead   Result Value Ref Range    Ventricular Rate 93 BPM    Atrial Rate 93 BPM    P-R Interval 156 ms    QRS Duration 96 ms    Q-T Interval 368 ms    QTc Calculation (Bazett) 457 ms    P Axis 73 degrees    R Axis 81 degrees    T Axis 71 degrees       RADIOLOGY:  CTA PULMONARY W CONTRAST   Final Result   1. Severe diffuse ground-glass opacities in both lungs with relative sparing   of the bilateral lung apices. The distribution is fairly symmetric. There   are scattered areas of interstitial prominence. (The imaging features are most suggestive of acute lung disease in that   clinical setting. ARDS could have this appearance.)      2. Prominent mediastinal and hilar lymph nodes. 3.  No evidence of pulmonary embolism. No evidence of right heart strain. 4.  Atherosclerotic disease. 5.  Remainder of the study is as above. RECOMMENDATIONS:   Recommend pulmonology consultation. Follow-up chest CT in 10-12 weeks also   suggested. XR CHEST PORTABLE   Final Result   Bilateral lung opacities right greater than left.               ------------------------- NURSING NOTES AND VITALS REVIEWED ---------------------------  Date / Time Roomed:  12/28/2022  3:09 PM  ED Bed Assignment:  24/24    The nursing notes within the ED encounter and vital signs as below have been reviewed.      Patient Vitals for the past 24 hrs:   BP Temp Temp src Pulse Resp SpO2 Height Weight   12/28/22 1712 100/65 -- -- 100 18 100 % -- --   12/28/22 1639 100/64 -- -- 92 22 100 % -- --   12/28/22 1545 110/71 -- -- 94 -- 100 % -- --   12/28/22 1530 122/68 -- -- 96 -- 99 % -- --   12/28/22 1515 116/76 -- -- 100 29 99 % -- --   12/28/22 1511 107/67 100.4 °F (38 °C) Oral (!) 103 26 99 % 6' 1\" (1.854 m) 180 lb (81.6 kg)       Oxygen Saturation Interpretation: Abnormal and Improved after treatment    ------------------------------------------ PROGRESS NOTES ------------------------------------------  Re-evaluation(s):  Time: 9251  Patients symptoms are improving  Repeat physical examination is improved    Counseling:  I have spoken with the patient and discussed todays results, in addition to providing specific details for the plan of care and counseling regarding the diagnosis and prognosis. Their questions are answered at this time and they are agreeable with the plan of admission.    --------------------------------- ADDITIONAL PROVIDER NOTES ---------------------------------  Consultations:  Time: 1091. Spoke with House Medicine. Discussed case. They will admit the patient. This patient's ED course included: a personal history and physicial examination, re-evaluation prior to disposition, multiple bedside re-evaluations, IV medications, cardiac monitoring, and continuous pulse oximetry    This patient has remained hemodynamically stable during their ED course. Diagnosis:  1. COPD exacerbation ([facility] [address]    2. Acute respiratory distress    3. Hypoxia    4. Influenza with respiratory manifestation other than pneumonia    5. Community acquired pneumonia, unspecified laterality        Disposition:  Patient's disposition: Admit to telemetry  Patient's condition is stable.            Mira Ace DO  Resident  12/28/22 0723

## 2022-12-28 NOTE — LETTER
[address] [place] [address]  Phone: [phone]    No name on file. January 5, 2023     Patient: Jose Antonio Solanor   YOB: 1970   Date of Visit: 12/28/2022       To Whom It May Concern:    Mr. Kevin Almaguer was a patient under our service at [facility] from 12/29/22 to 1/5/2023. Patient is to not return to work until he is evaluated outpatient with his Primary Care Physician. The patient needs to be evaluated by his Primary Care Physician within 7 days from discharge. Any further work restrictions will be per his Primary Care Physician. If you have any questions or concerns, please don't hesitate to call.     Sincerely,        Dr. Guaman Nip

## 2022-12-29 PROBLEM — J44.1 COPD EXACERBATION (HCC): Status: ACTIVE | Noted: 2022-12-29

## 2022-12-29 LAB
ADENOVIRUS BY PCR: NOT DETECTED
ADENOVIRUS BY PCR: NOT DETECTED
ALBUMIN SERPL-MCNC: 2.9 G/DL (ref 3.5–5.2)
ALBUMIN SERPL-MCNC: 2.9 G/DL (ref 3.5–5.2)
ALP BLD-CCNC: 43 U/L (ref 40–129)
ALP BLD-CCNC: 43 U/L (ref 40–129)
ALT SERPL-CCNC: 16 U/L (ref 0–40)
ALT SERPL-CCNC: 16 U/L (ref 0–40)
ANION GAP SERPL CALCULATED.3IONS-SCNC: 12 MMOL/L (ref 7–16)
ANISOCYTOSIS: ABNORMAL
ANISOCYTOSIS: ABNORMAL
AST SERPL-CCNC: 51 U/L (ref 0–39)
AST SERPL-CCNC: 51 U/L (ref 0–39)
ATYPICAL LYMPHOCYTE RELATIVE PERCENT: 0.8 % (ref 0–4)
ATYPICAL LYMPHOCYTE RELATIVE PERCENT: 0.8 % (ref 0–4)
B.E.: -1.9 MMOL/L (ref -3–3)
B.E.: -1.9 MMOL/L (ref -3–3)
BASOPHILS ABSOLUTE: 0 E9/L (ref 0–0.2)
BASOPHILS RELATIVE PERCENT: 0.2 % (ref 0–2)
BASOPHILS RELATIVE PERCENT: 0.2 % (ref 0–2)
BASOPHILS RELATIVE PERCENT: 0.4 % (ref 0–2)
BASOPHILS RELATIVE PERCENT: 0.4 % (ref 0–2)
BETA-HYDROXYBUTYRATE: 0.54 MMOL/L (ref 0.02–0.27)
BETA-HYDROXYBUTYRATE: 0.54 MMOL/L (ref 0.02–0.27)
BILIRUB SERPL-MCNC: 0.3 MG/DL (ref 0–1.2)
BILIRUB SERPL-MCNC: 0.3 MG/DL (ref 0–1.2)
BORDETELLA PARAPERTUSSIS BY PCR: NOT DETECTED
BORDETELLA PARAPERTUSSIS BY PCR: NOT DETECTED
BORDETELLA PERTUSSIS BY PCR: NOT DETECTED
BORDETELLA PERTUSSIS BY PCR: NOT DETECTED
BUN BLDV-MCNC: 19 MG/DL (ref 6–20)
BUN BLDV-MCNC: 19 MG/DL (ref 6–20)
BUN BLDV-MCNC: 20 MG/DL (ref 6–20)
BUN BLDV-MCNC: 20 MG/DL (ref 6–20)
C-REACTIVE PROTEIN: 20.7 MG/DL (ref 0–0.4)
C-REACTIVE PROTEIN: 20.7 MG/DL (ref 0–0.4)
CALCIUM SERPL-MCNC: 8.1 MG/DL (ref 8.6–10.2)
CALCIUM SERPL-MCNC: 8.1 MG/DL (ref 8.6–10.2)
CALCIUM SERPL-MCNC: 8.2 MG/DL (ref 8.6–10.2)
CALCIUM SERPL-MCNC: 8.2 MG/DL (ref 8.6–10.2)
CHLAMYDOPHILIA PNEUMONIAE BY PCR: NOT DETECTED
CHLAMYDOPHILIA PNEUMONIAE BY PCR: NOT DETECTED
CHLORIDE BLD-SCNC: 102 MMOL/L (ref 98–107)
CHOLESTEROL, TOTAL: 115 MG/DL (ref 0–199)
CHOLESTEROL, TOTAL: 115 MG/DL (ref 0–199)
CHP ED QC CHECK: NORMAL
CHP ED QC CHECK: NORMAL
CO2: 23 MMOL/L (ref 22–29)
COHB: 0.2 % (ref 0–1.5)
COHB: 0.2 % (ref 0–1.5)
CORONAVIRUS 229E BY PCR: NOT DETECTED
CORONAVIRUS 229E BY PCR: NOT DETECTED
CORONAVIRUS HKU1 BY PCR: NOT DETECTED
CORONAVIRUS HKU1 BY PCR: NOT DETECTED
CORONAVIRUS NL63 BY PCR: NOT DETECTED
CORONAVIRUS NL63 BY PCR: NOT DETECTED
CORONAVIRUS OC43 BY PCR: NOT DETECTED
CORONAVIRUS OC43 BY PCR: NOT DETECTED
CREAT SERPL-MCNC: 0.6 MG/DL (ref 0.7–1.2)
CRITICAL: ABNORMAL
CRITICAL: ABNORMAL
DATE ANALYZED: ABNORMAL
DATE ANALYZED: ABNORMAL
DATE OF COLLECTION: ABNORMAL
DATE OF COLLECTION: ABNORMAL
EKG ATRIAL RATE: 93 BPM
EKG ATRIAL RATE: 93 BPM
EKG P AXIS: 73 DEGREES
EKG P AXIS: 73 DEGREES
EKG P-R INTERVAL: 156 MS
EKG P-R INTERVAL: 156 MS
EKG Q-T INTERVAL: 368 MS
EKG Q-T INTERVAL: 368 MS
EKG QRS DURATION: 96 MS
EKG QRS DURATION: 96 MS
EKG QTC CALCULATION (BAZETT): 457 MS
EKG QTC CALCULATION (BAZETT): 457 MS
EKG R AXIS: 81 DEGREES
EKG R AXIS: 81 DEGREES
EKG T AXIS: 71 DEGREES
EKG T AXIS: 71 DEGREES
EKG VENTRICULAR RATE: 93 BPM
EKG VENTRICULAR RATE: 93 BPM
EOSINOPHILS ABSOLUTE: 0 E9/L (ref 0.05–0.5)
EOSINOPHILS RELATIVE PERCENT: 0 % (ref 0–6)
GFR SERPL CREATININE-BSD FRML MDRD: >60 ML/MIN/1.73
GLUCOSE BLD-MCNC: 322 MG/DL (ref 74–99)
GLUCOSE BLD-MCNC: 322 MG/DL (ref 74–99)
GLUCOSE BLD-MCNC: 326 MG/DL
GLUCOSE BLD-MCNC: 326 MG/DL
GLUCOSE BLD-MCNC: 353 MG/DL (ref 74–99)
GLUCOSE BLD-MCNC: 353 MG/DL (ref 74–99)
HBA1C MFR BLD: 7.8 % (ref 4–5.6)
HBA1C MFR BLD: 7.8 % (ref 4–5.6)
HCO3: 21.1 MMOL/L (ref 22–26)
HCO3: 21.1 MMOL/L (ref 22–26)
HCT VFR BLD CALC: 35.1 % (ref 37–54)
HCT VFR BLD CALC: 35.1 % (ref 37–54)
HCT VFR BLD CALC: 35.3 % (ref 37–54)
HCT VFR BLD CALC: 35.3 % (ref 37–54)
HDLC SERPL-MCNC: 21 MG/DL
HDLC SERPL-MCNC: 21 MG/DL
HEMOGLOBIN: 12.1 G/DL (ref 12.5–16.5)
HEMOGLOBIN: 12.1 G/DL (ref 12.5–16.5)
HEMOGLOBIN: 12.4 G/DL (ref 12.5–16.5)
HEMOGLOBIN: 12.4 G/DL (ref 12.5–16.5)
HHB: 5.2 % (ref 0–5)
HHB: 5.2 % (ref 0–5)
HUMAN METAPNEUMOVIRUS BY PCR: NOT DETECTED
HUMAN METAPNEUMOVIRUS BY PCR: NOT DETECTED
HUMAN RHINOVIRUS/ENTEROVIRUS BY PCR: NOT DETECTED
HUMAN RHINOVIRUS/ENTEROVIRUS BY PCR: NOT DETECTED
INFLUENZA A BY PCR: NOT DETECTED
INFLUENZA A BY PCR: NOT DETECTED
INFLUENZA B BY PCR: NOT DETECTED
INFLUENZA B BY PCR: NOT DETECTED
L. PNEUMOPHILA SEROGP 1 UR AG: NORMAL
L. PNEUMOPHILA SEROGP 1 UR AG: NORMAL
LAB: ABNORMAL
LAB: ABNORMAL
LACTIC ACID: 2.2 MMOL/L (ref 0.5–2.2)
LACTIC ACID: 2.2 MMOL/L (ref 0.5–2.2)
LACTIC ACID: 2.7 MMOL/L (ref 0.5–2.2)
LACTIC ACID: 2.7 MMOL/L (ref 0.5–2.2)
LDL CHOLESTEROL CALCULATED: 57 MG/DL (ref 0–99)
LDL CHOLESTEROL CALCULATED: 57 MG/DL (ref 0–99)
LYMPHOCYTES ABSOLUTE: 0.4 E9/L (ref 1.5–4)
LYMPHOCYTES ABSOLUTE: 0.4 E9/L (ref 1.5–4)
LYMPHOCYTES ABSOLUTE: 0.61 E9/L (ref 1.5–4)
LYMPHOCYTES ABSOLUTE: 0.61 E9/L (ref 1.5–4)
LYMPHOCYTES RELATIVE PERCENT: 12.2 % (ref 20–42)
LYMPHOCYTES RELATIVE PERCENT: 12.2 % (ref 20–42)
LYMPHOCYTES RELATIVE PERCENT: 7.8 % (ref 20–42)
LYMPHOCYTES RELATIVE PERCENT: 7.8 % (ref 20–42)
Lab: ABNORMAL
Lab: ABNORMAL
MCH RBC QN AUTO: 31.2 PG (ref 26–35)
MCH RBC QN AUTO: 31.2 PG (ref 26–35)
MCH RBC QN AUTO: 31.3 PG (ref 26–35)
MCH RBC QN AUTO: 31.3 PG (ref 26–35)
MCHC RBC AUTO-ENTMCNC: 34.3 % (ref 32–34.5)
MCHC RBC AUTO-ENTMCNC: 34.3 % (ref 32–34.5)
MCHC RBC AUTO-ENTMCNC: 35.3 % (ref 32–34.5)
MCHC RBC AUTO-ENTMCNC: 35.3 % (ref 32–34.5)
MCV RBC AUTO: 88.6 FL (ref 80–99.9)
MCV RBC AUTO: 88.6 FL (ref 80–99.9)
MCV RBC AUTO: 91 FL (ref 80–99.9)
MCV RBC AUTO: 91 FL (ref 80–99.9)
METER GLUCOSE: 326 MG/DL (ref 74–99)
METER GLUCOSE: 326 MG/DL (ref 74–99)
METER GLUCOSE: 344 MG/DL (ref 74–99)
METER GLUCOSE: 344 MG/DL (ref 74–99)
METER GLUCOSE: 348 MG/DL (ref 74–99)
METER GLUCOSE: 348 MG/DL (ref 74–99)
METER GLUCOSE: 356 MG/DL (ref 74–99)
METER GLUCOSE: 356 MG/DL (ref 74–99)
METER GLUCOSE: 408 MG/DL (ref 74–99)
METER GLUCOSE: 408 MG/DL (ref 74–99)
METHB: 0.3 % (ref 0–1.5)
METHB: 0.3 % (ref 0–1.5)
MODE: ABNORMAL
MODE: ABNORMAL
MONOCYTES ABSOLUTE: 0.1 E9/L (ref 0.1–0.95)
MONOCYTES ABSOLUTE: 0.1 E9/L (ref 0.1–0.95)
MONOCYTES ABSOLUTE: 0.19 E9/L (ref 0.1–0.95)
MONOCYTES ABSOLUTE: 0.19 E9/L (ref 0.1–0.95)
MONOCYTES RELATIVE PERCENT: 1.8 % (ref 2–12)
MONOCYTES RELATIVE PERCENT: 1.8 % (ref 2–12)
MONOCYTES RELATIVE PERCENT: 3.5 % (ref 2–12)
MONOCYTES RELATIVE PERCENT: 3.5 % (ref 2–12)
MYCOPLASMA PNEUMONIAE BY PCR: NOT DETECTED
MYCOPLASMA PNEUMONIAE BY PCR: NOT DETECTED
NEUTROPHILS ABSOLUTE: 3.95 E9/L (ref 1.8–7.3)
NEUTROPHILS ABSOLUTE: 3.95 E9/L (ref 1.8–7.3)
NEUTROPHILS ABSOLUTE: 4.5 E9/L (ref 1.8–7.3)
NEUTROPHILS ABSOLUTE: 4.5 E9/L (ref 1.8–7.3)
NEUTROPHILS RELATIVE PERCENT: 83.5 % (ref 43–80)
NEUTROPHILS RELATIVE PERCENT: 83.5 % (ref 43–80)
NEUTROPHILS RELATIVE PERCENT: 90.4 % (ref 43–80)
NEUTROPHILS RELATIVE PERCENT: 90.4 % (ref 43–80)
O2 SATURATION: 94.8 % (ref 92–98.5)
O2 SATURATION: 94.8 % (ref 92–98.5)
O2HB: 94.3 % (ref 94–97)
O2HB: 94.3 % (ref 94–97)
OPERATOR ID: 2860
OPERATOR ID: 2860
PARAINFLUENZA VIRUS 1 BY PCR: NOT DETECTED
PARAINFLUENZA VIRUS 1 BY PCR: NOT DETECTED
PARAINFLUENZA VIRUS 2 BY PCR: NOT DETECTED
PARAINFLUENZA VIRUS 2 BY PCR: NOT DETECTED
PARAINFLUENZA VIRUS 3 BY PCR: NOT DETECTED
PARAINFLUENZA VIRUS 3 BY PCR: NOT DETECTED
PARAINFLUENZA VIRUS 4 BY PCR: NOT DETECTED
PARAINFLUENZA VIRUS 4 BY PCR: NOT DETECTED
PATHOLOGIST REVIEW: NORMAL
PATHOLOGIST REVIEW: NORMAL
PATIENT TEMP: 37 C
PATIENT TEMP: 37 C
PCO2: 30.9 MMHG (ref 35–45)
PCO2: 30.9 MMHG (ref 35–45)
PDW BLD-RTO: 12.3 FL (ref 11.5–15)
PH BLOOD GAS: 7.45 (ref 7.35–7.45)
PH BLOOD GAS: 7.45 (ref 7.35–7.45)
PLATELET # BLD: 71 E9/L (ref 130–450)
PLATELET # BLD: 71 E9/L (ref 130–450)
PLATELET # BLD: 74 E9/L (ref 130–450)
PLATELET # BLD: 74 E9/L (ref 130–450)
PLATELET CONFIRMATION: NORMAL
PMV BLD AUTO: 10.5 FL (ref 7–12)
PMV BLD AUTO: 10.5 FL (ref 7–12)
PMV BLD AUTO: 10.7 FL (ref 7–12)
PMV BLD AUTO: 10.7 FL (ref 7–12)
PO2: 77.7 MMHG (ref 75–100)
PO2: 77.7 MMHG (ref 75–100)
POTASSIUM SERPL-SCNC: 4.3 MMOL/L (ref 3.5–5)
POTASSIUM SERPL-SCNC: 4.3 MMOL/L (ref 3.5–5)
POTASSIUM SERPL-SCNC: 4.5 MMOL/L (ref 3.5–5)
POTASSIUM SERPL-SCNC: 4.5 MMOL/L (ref 3.5–5)
PROCALCITONIN: 0.97 NG/ML (ref 0–0.08)
PROCALCITONIN: 0.97 NG/ML (ref 0–0.08)
RBC # BLD: 3.88 E12/L (ref 3.8–5.8)
RBC # BLD: 3.88 E12/L (ref 3.8–5.8)
RBC # BLD: 3.96 E12/L (ref 3.8–5.8)
RBC # BLD: 3.96 E12/L (ref 3.8–5.8)
RESPIRATORY SYNCYTIAL VIRUS BY PCR: NOT DETECTED
RESPIRATORY SYNCYTIAL VIRUS BY PCR: NOT DETECTED
SARS-COV-2, PCR: NOT DETECTED
SARS-COV-2, PCR: NOT DETECTED
SODIUM BLD-SCNC: 137 MMOL/L (ref 132–146)
SOURCE, BLOOD GAS: ABNORMAL
SOURCE, BLOOD GAS: ABNORMAL
STREP PNEUMONIAE ANTIGEN, URINE: NORMAL
STREP PNEUMONIAE ANTIGEN, URINE: NORMAL
THB: 13.4 G/DL (ref 11.5–16.5)
THB: 13.4 G/DL (ref 11.5–16.5)
TIME ANALYZED: 650
TIME ANALYZED: 650
TOTAL PROTEIN: 6.6 G/DL (ref 6.4–8.3)
TOTAL PROTEIN: 6.6 G/DL (ref 6.4–8.3)
TRIGL SERPL-MCNC: 183 MG/DL (ref 0–149)
TRIGL SERPL-MCNC: 183 MG/DL (ref 0–149)
VLDLC SERPL CALC-MCNC: 37 MG/DL
VLDLC SERPL CALC-MCNC: 37 MG/DL
WBC # BLD: 4.7 E9/L (ref 4.5–11.5)
WBC # BLD: 4.7 E9/L (ref 4.5–11.5)
WBC # BLD: 5 E9/L (ref 4.5–11.5)
WBC # BLD: 5 E9/L (ref 4.5–11.5)

## 2022-12-29 PROCEDURE — 6370000000 HC RX 637 (ALT 250 FOR IP)

## 2022-12-29 PROCEDURE — 2700000000 HC OXYGEN THERAPY PER DAY

## 2022-12-29 PROCEDURE — 2580000003 HC RX 258

## 2022-12-29 PROCEDURE — 36415 COLL VENOUS BLD VENIPUNCTURE: CPT

## 2022-12-29 PROCEDURE — 83036 HEMOGLOBIN GLYCOSYLATED A1C: CPT

## 2022-12-29 PROCEDURE — 85025 COMPLETE CBC W/AUTO DIFF WBC: CPT

## 2022-12-29 PROCEDURE — 6360000002 HC RX W HCPCS: Performed by: INTERNAL MEDICINE

## 2022-12-29 PROCEDURE — 6360000002 HC RX W HCPCS

## 2022-12-29 PROCEDURE — 80061 LIPID PANEL: CPT

## 2022-12-29 PROCEDURE — 82962 GLUCOSE BLOOD TEST: CPT

## 2022-12-29 PROCEDURE — 80053 COMPREHEN METABOLIC PANEL: CPT

## 2022-12-29 PROCEDURE — 87389 HIV-1 AG W/HIV-1&-2 AB AG IA: CPT

## 2022-12-29 PROCEDURE — 99223 1ST HOSP IP/OBS HIGH 75: CPT | Performed by: INTERNAL MEDICINE

## 2022-12-29 PROCEDURE — S5553 INSULIN LONG ACTING 5 U: HCPCS | Performed by: CHIROPRACTOR

## 2022-12-29 PROCEDURE — 82010 KETONE BODYS QUAN: CPT

## 2022-12-29 PROCEDURE — 80048 BASIC METABOLIC PNL TOTAL CA: CPT

## 2022-12-29 PROCEDURE — 82805 BLOOD GASES W/O2 SATURATION: CPT

## 2022-12-29 PROCEDURE — 93010 ELECTROCARDIOGRAM REPORT: CPT | Performed by: INTERNAL MEDICINE

## 2022-12-29 PROCEDURE — 6370000000 HC RX 637 (ALT 250 FOR IP): Performed by: INTERNAL MEDICINE

## 2022-12-29 PROCEDURE — 94640 AIRWAY INHALATION TREATMENT: CPT

## 2022-12-29 PROCEDURE — 83605 ASSAY OF LACTIC ACID: CPT

## 2022-12-29 PROCEDURE — 84145 PROCALCITONIN (PCT): CPT

## 2022-12-29 PROCEDURE — 94660 CPAP INITIATION&MGMT: CPT

## 2022-12-29 PROCEDURE — 2060000000 HC ICU INTERMEDIATE R&B

## 2022-12-29 PROCEDURE — 2500000003 HC RX 250 WO HCPCS

## 2022-12-29 PROCEDURE — 6370000000 HC RX 637 (ALT 250 FOR IP): Performed by: CHIROPRACTOR

## 2022-12-29 RX ORDER — INSULIN LISPRO 100 [IU]/ML
0-8 INJECTION, SOLUTION INTRAVENOUS; SUBCUTANEOUS
Status: DISCONTINUED | OUTPATIENT
Start: 2022-12-29 | End: 2022-12-30

## 2022-12-29 RX ORDER — ARFORMOTEROL TARTRATE 15 UG/2ML
15 SOLUTION RESPIRATORY (INHALATION) 2 TIMES DAILY
Status: DISCONTINUED | OUTPATIENT
Start: 2022-12-29 | End: 2023-01-05 | Stop reason: HOSPADM

## 2022-12-29 RX ORDER — ACETAMINOPHEN 325 MG/1
650 TABLET ORAL EVERY 6 HOURS PRN
COMMUNITY

## 2022-12-29 RX ORDER — METHYLPREDNISOLONE SODIUM SUCCINATE 40 MG/ML
40 INJECTION, POWDER, LYOPHILIZED, FOR SOLUTION INTRAMUSCULAR; INTRAVENOUS EVERY 12 HOURS
Status: DISCONTINUED | OUTPATIENT
Start: 2022-12-29 | End: 2022-12-31

## 2022-12-29 RX ORDER — HYDROXYZINE HYDROCHLORIDE 50 MG/ML
25 INJECTION, SOLUTION INTRAMUSCULAR ONCE
Status: DISCONTINUED | OUTPATIENT
Start: 2022-12-29 | End: 2023-01-05 | Stop reason: HOSPADM

## 2022-12-29 RX ORDER — IBUPROFEN 200 MG
200 TABLET ORAL EVERY 6 HOURS PRN
COMMUNITY

## 2022-12-29 RX ORDER — METHYLPREDNISOLONE SODIUM SUCCINATE 125 MG/2ML
125 INJECTION, POWDER, LYOPHILIZED, FOR SOLUTION INTRAMUSCULAR; INTRAVENOUS ONCE
Status: COMPLETED | OUTPATIENT
Start: 2022-12-29 | End: 2022-12-29

## 2022-12-29 RX ORDER — INSULIN GLARGINE-YFGN 100 [IU]/ML
10 INJECTION, SOLUTION SUBCUTANEOUS NIGHTLY
Status: DISCONTINUED | OUTPATIENT
Start: 2022-12-29 | End: 2022-12-31

## 2022-12-29 RX ORDER — DEXTROSE MONOHYDRATE 100 MG/ML
INJECTION, SOLUTION INTRAVENOUS CONTINUOUS PRN
Status: DISCONTINUED | OUTPATIENT
Start: 2022-12-29 | End: 2023-01-05 | Stop reason: HOSPADM

## 2022-12-29 RX ORDER — NAPROXEN 500 MG/1
500 TABLET ORAL 2 TIMES DAILY PRN
Status: ON HOLD | COMMUNITY
End: 2023-01-04 | Stop reason: HOSPADM

## 2022-12-29 RX ORDER — LANOLIN ALCOHOL/MO/W.PET/CERES
3 CREAM (GRAM) TOPICAL NIGHTLY PRN
Status: DISPENSED | OUTPATIENT
Start: 2022-12-29 | End: 2023-01-01

## 2022-12-29 RX ORDER — INSULIN LISPRO 100 [IU]/ML
0-4 INJECTION, SOLUTION INTRAVENOUS; SUBCUTANEOUS NIGHTLY
Status: DISCONTINUED | OUTPATIENT
Start: 2022-12-29 | End: 2022-12-30

## 2022-12-29 RX ORDER — ONDANSETRON 4 MG/1
8 TABLET, ORALLY DISINTEGRATING ORAL EVERY 8 HOURS PRN
COMMUNITY
End: 2023-01-12

## 2022-12-29 RX ORDER — BUDESONIDE 0.25 MG/2ML
250 INHALANT ORAL 2 TIMES DAILY
Status: DISCONTINUED | OUTPATIENT
Start: 2022-12-29 | End: 2023-01-05 | Stop reason: HOSPADM

## 2022-12-29 RX ADMIN — INSULIN GLARGINE-YFGN 10 UNITS: 100 INJECTION, SOLUTION SUBCUTANEOUS at 20:41

## 2022-12-29 RX ADMIN — Medication 1 MG: at 09:36

## 2022-12-29 RX ADMIN — IPRATROPIUM BROMIDE AND ALBUTEROL SULFATE 1 AMPULE: .5; 2.5 SOLUTION RESPIRATORY (INHALATION) at 11:27

## 2022-12-29 RX ADMIN — OSELTAMIVIR PHOSPHATE 75 MG: 75 CAPSULE ORAL at 10:40

## 2022-12-29 RX ADMIN — SODIUM CHLORIDE, PRESERVATIVE FREE 10 ML: 5 INJECTION INTRAVENOUS at 02:53

## 2022-12-29 RX ADMIN — INSULIN LISPRO 4 UNITS: 100 INJECTION, SOLUTION INTRAVENOUS; SUBCUTANEOUS at 00:18

## 2022-12-29 RX ADMIN — ARFORMOTEROL TARTRATE 15 MCG: 15 SOLUTION RESPIRATORY (INHALATION) at 20:51

## 2022-12-29 RX ADMIN — INSULIN LISPRO 6 UNITS: 100 INJECTION, SOLUTION INTRAVENOUS; SUBCUTANEOUS at 08:12

## 2022-12-29 RX ADMIN — IPRATROPIUM BROMIDE AND ALBUTEROL SULFATE 1 AMPULE: .5; 2.5 SOLUTION RESPIRATORY (INHALATION) at 16:35

## 2022-12-29 RX ADMIN — ENOXAPARIN SODIUM 40 MG: 100 INJECTION SUBCUTANEOUS at 09:34

## 2022-12-29 RX ADMIN — INSULIN LISPRO 6 UNITS: 100 INJECTION, SOLUTION INTRAVENOUS; SUBCUTANEOUS at 13:00

## 2022-12-29 RX ADMIN — DOXYCYCLINE 100 MG: 100 INJECTION, POWDER, LYOPHILIZED, FOR SOLUTION INTRAVENOUS at 20:41

## 2022-12-29 RX ADMIN — BUDESONIDE 250 MCG: 0.25 INHALANT ORAL at 20:51

## 2022-12-29 RX ADMIN — DOXYCYCLINE 100 MG: 100 INJECTION, POWDER, LYOPHILIZED, FOR SOLUTION INTRAVENOUS at 06:30

## 2022-12-29 RX ADMIN — SODIUM CHLORIDE, PRESERVATIVE FREE 10 ML: 5 INJECTION INTRAVENOUS at 09:33

## 2022-12-29 RX ADMIN — Medication 100 MG: at 09:37

## 2022-12-29 RX ADMIN — CEFTRIAXONE 1000 MG: 1 INJECTION, POWDER, FOR SOLUTION INTRAMUSCULAR; INTRAVENOUS at 00:07

## 2022-12-29 RX ADMIN — IPRATROPIUM BROMIDE AND ALBUTEROL SULFATE 1 AMPULE: .5; 2.5 SOLUTION RESPIRATORY (INHALATION) at 20:51

## 2022-12-29 RX ADMIN — METHYLPREDNISOLONE SODIUM SUCCINATE 40 MG: 40 INJECTION, POWDER, FOR SOLUTION INTRAMUSCULAR; INTRAVENOUS at 12:55

## 2022-12-29 RX ADMIN — METHYLPREDNISOLONE SODIUM SUCCINATE 40 MG: 40 INJECTION, POWDER, FOR SOLUTION INTRAMUSCULAR; INTRAVENOUS at 23:15

## 2022-12-29 RX ADMIN — METHYLPREDNISOLONE SODIUM SUCCINATE 125 MG: 125 INJECTION, POWDER, FOR SOLUTION INTRAMUSCULAR; INTRAVENOUS at 02:49

## 2022-12-29 RX ADMIN — CEFTRIAXONE 1000 MG: 1 INJECTION, POWDER, FOR SOLUTION INTRAMUSCULAR; INTRAVENOUS at 23:15

## 2022-12-29 RX ADMIN — INSULIN LISPRO 4 UNITS: 100 INJECTION, SOLUTION INTRAVENOUS; SUBCUTANEOUS at 20:41

## 2022-12-29 RX ADMIN — IPRATROPIUM BROMIDE AND ALBUTEROL SULFATE 1 AMPULE: .5; 2.5 SOLUTION RESPIRATORY (INHALATION) at 07:54

## 2022-12-29 ASSESSMENT — PAIN - FUNCTIONAL ASSESSMENT
PAIN_FUNCTIONAL_ASSESSMENT: NONE - DENIES PAIN

## 2022-12-29 NOTE — ED NOTES
Urinal provided.  Patient made aware of need for urine sample     Tressa Villanueva RN  12/28/22 2041

## 2022-12-29 NOTE — PROGRESS NOTES
[facility]  Internal Medicine Department    Attending Physician Statement:  Sher Russo. KIRTI.O. I have discussed the case, including pertinent history and exam findings with the resident. I have reviewed all past medical history, past surgical history, family history, social history, medications, and allergies and updated as appropriate in the history section of the chart. I have seen and examined the patient and the key elements of the encounter have been performed by me. I agree with the assessment, plan and orders as documented by the resident. Acute Hypoxic Respiratory Failure 2/2 likely COPD exacerbation 2/2 Influenza and CAP   -improving and was able to be titrated off bipap to nasal canula   -undiagnosed COPD; does not use any inhalers at home  -tobacco abuse 1.5 ppd   -ground glass opaccities on CTA; atelectasis vs consolidation in lower lobe  -continue ceftriaxone and doxycycline, continue tamiflu  -aggressive pulmonary toilet  -bronchodilators and LABA/ICS  -BiPAP at night and during rest     NIDDM2  -does not use insulin at home  -hyperglycemia 2/2 steroids   -titrate insulin as needed     Thrombocytopenia  -likely 2/2 combination of alcohol use and infection   -blood work pending     Alcohol Use  -vitamin supplementation     DVT and GI proplylaxis       Remainder of medical problems as per resident note.

## 2022-12-29 NOTE — H&P
[facility]  Internal Medicine Residency Program  History and Physical    Patient:  Moshe Kelly 47 y.o. male MRN: [id number]     Date of Service: 12/28/2022    Hospital Day: 1      Chief complaint: had concerns including Shortness of Breath (Patient battling influenza since 12/23, was in respiratory distress at home today 35% RA. ). History of Present Illness   The patient is a 47 y.o. male with a past medical history of type 2 diabetes, hypertension, hyperlipidemia and tobacco use disorder presents for shortness of breath, nausea, vomiting and diarrhea for the past few days. Patient recently went to [facility] ED on 12/24 and was found to be influenza A positive and was discharged home. Patient stated he has not been able to eat anything since Friday due to the nausea and vomiting. Nausea and vomiting has subsided for the past few days. He did mention he had a fever at home of 102.5 at its max. His daughter was present and did say that he has become increasingly more hypoxic when they check with a home pulse ox. Over the last few days he has used supplemental oxygen that his daughter gave him from the grandmother that did help but the patient did say he still had shortness of breath. He has been short of breath for years due to his history of smoking but it is felt worse over the last few days. Patient did say he smokes about a pack and 1/2 to 2 packs a day for years. He also mention he has not been to a doctor or on any medication for over a year. EMS put him on CPAP and his oxygen saturation was around 70%. When he arrived to the ED he was transitioned to BiPAP and was also given IV magnesium that improved his oxygenation to greater than 90%. CTA did show severe diffuse groundglass opacities in both lungs. And patient was still positive for influenza A. Urine and blood cultures were drawn and IV antibiotics were given.   Patient was then admitted for further management and work-up. Past Medical History:  History reviewed. No pertinent past medical history. Past Surgical History:    History reviewed. No pertinent surgical history. Medications Prior to Admission:    Prior to Admission medications    Not on File       Allergies:  Patient has no known allergies. Social History:   TOBACCO:   reports that he has been smoking cigarettes. He has been smoking an average of 1 pack per day. He has never used smokeless tobacco.  ETOH:   reports current alcohol use. Family History:   History reviewed. No pertinent family history. REVIEW OF SYSTEMS:    Constitutional:  fever, no chills, no change in weight; no appetite  HEENT: No blurred vision, no ear problems, no sore throat, no rhinorrhea. Respiratory: No cough, no sputum production, no pleuritic chest pain, shortness of breath  Cardiology: No angina,  dyspnea on exertion, no paroxysmal nocturnal dyspnea, no orthopnea, no palpitation, no leg swelling. Gastroenterology: No dysphagia, no reflux; no abdominal pain, +nausea or vomiting; no constipation or diarrhea. No hematochezia   Genitourinary: No dysuria, no frequency, hesitancy; no hematuria  Musculoskeletal: no joint pain, no myalgia, no change in range of movement  Neurology: no focal weakness in extremities, no slurred speech, no double vision, no tingling or numbness sensation  Endocrinology: no temperature intolerance, no polyphagia, polydipsia or polyuria  Hematology: no increased bleeding, no bruising, no lymphadenopathy  Skin: no skin changes noticed by patient  Psychology: no depressed mood, no suicidal ideation    Physical Exam   Vitals: /68   Pulse 88   Temp 100.4 °F (38 °C) (Oral)   Resp 28   Ht 6' 1\" (1.854 m)   Wt 180 lb (81.6 kg)   SpO2 100%   BMI 23.75 kg/m²     General Appearance: alert and oriented to person, place and time, well developed and well- nourished, in no acute distress on BiPAP mask.   Skin: warm and dry, no rash or erythema  Head: normocephalic and atraumatic  Neck: supple and non-tender without mass, no thyromegaly or thyroid nodules, no cervical lymphadenopathy  Pulmonary/Chest: clear to auscultation bilaterally- no wheezes, rales or rhonchi, diminished air movement, no respiratory distress on BiPAP  Cardiovascular: normal rate, regular rhythm, normal S1 and S2, no murmurs, rubs, clicks, or gallops, distal pulses intact, no carotid bruits  Abdomen: soft, non-tender, non-distended, normal bowel sounds, no masses or organomegaly  Extremities: no cyanosis, clubbing or edema  Musculoskeletal: normal range of motion, no joint swelling, deformity or tenderness  Neurologic: gait, coordination and speech normal   Labs and Imaging Studies   Basic Labs  Recent Labs     12/28/22  1511 12/28/22  1536   NA  --  134   K 3.79 3.9   CL  --  97*   CO2  --  19*   BUN  --  19   CREATININE  --  0.9   GLUCOSE  --  149*   CALCIUM  --  8.3*       Recent Labs     12/28/22  1536   WBC 5.9   RBC 4.22   HGB 13.3   HCT 36.6*   MCV 86.7   MCH 31.5   MCHC 36.3*   RDW 12.4   PLT 79*   MPV 10.9         Imaging Studies:  CTA PULMONARY W CONTRAST   Final Result   1. Severe diffuse ground-glass opacities in both lungs with relative sparing   of the bilateral lung apices. The distribution is fairly symmetric. There   are scattered areas of interstitial prominence. (The imaging features are most suggestive of acute lung disease in that   clinical setting. ARDS could have this appearance.)      2. Prominent mediastinal and hilar lymph nodes. 3.  No evidence of pulmonary embolism. No evidence of right heart strain. 4.  Atherosclerotic disease. 5.  Remainder of the study is as above. RECOMMENDATIONS:   Recommend pulmonology consultation. Follow-up chest CT in 10-12 weeks also   suggested. XR CHEST PORTABLE   Final Result   Bilateral lung opacities right greater than left.                   Resident's Assessment and Plan Assessment and Plan:    Acute hypoxic respiratory failure 2/2 influenza and possible superimposed pneumonia  -Influenza A positive  -CTA concerning for possible ARDS, severe diffuse ground glass opacities  -Continue BiPAP  -Continue DuoNebs  -Follow-up viral panel, sputum culture, Legionella and strep antigen  -Started on Rocephin-can change pending infectious work-up  -Give 125 Solu-Medrol dose  -Monitor respiratory status and wean oxygen requirements as tolerated    Influenza A infection  Continue on Tamiflu  Monitor respiratory status    Lactic acidosis  Lactic acid on admission 2.8  Trend until less than 2    HAG MA 2/2 lactic acidosis  Anion gap 18 on admission  Monitor CMP    Thrombocytopenia 2/2 sepsis  Platelets on admission 79  Previously decreased on 12/24  Follow-up peripheral blood smear and HIV    Type 2 diabetes  Previously on metformin but not currently on any medications for over a year  Repeat A1c  Started on low-dose sliding scale  Monitor blood glucose AC at bedtime    Transaminitis 2/2 alcohol use  CIWA protocol to evaluate and as needed  Patient states he drinks 2-3 beers every day  ALT 16, AST 55 on admission    Hyperlipidemia  Previously on atorvastatin, not currently on any medication  Repeat lipid panel-follow results    Hypertension  Previously on lisinopril, not currently on any medication  Continue with no medication and monitor blood pressure      PT/OT evaluation: Not indicated at this time  DVT prophylaxis/ GI prophylaxis: Lovenox/diet  Disposition: admit to Johanne Goodwin MD, PGY-1  Attending physician: Dr. Ariela Morris  Precepted with Dr. Ariela Morris

## 2022-12-29 NOTE — ED NOTES
Patient assisted to take off bipap to drink gatorade. Patient back on bipap. No complaints at this time.       Manfred Paul, RN  12/28/22 5435

## 2022-12-29 NOTE — ED NOTES
Patient refused glucose check and insulin. Provider notified.       Rockland Bence, RN  12/29/22 0000

## 2022-12-29 NOTE — PROGRESS NOTES
[facility]  Internal Medicine Residency Program  Progress Note - House Team     Patient:  Melisa Amezcua 47 y.o. male MRN: [id number]     Date of Service: 12/29/2022     CC: SOB    Days since admission: 1    Subjective     Overnight events: pt was admitted overnight, started on Abx and tamiflu, on HFNC 15 L     Objective     Physical Exam:  Vitals: /78   Pulse 80   Temp 97.7 °F (36.5 °C) (Axillary)   Resp 20   Ht 6' 1\" (1.854 m)   Wt 180 lb (81.6 kg)   SpO2 93%   BMI 23.75 kg/m²     I & O - 24hr:   Intake/Output Summary (Last 24 hours) at 12/29/2022 1452  Last data filed at 12/29/2022 1042  Gross per 24 hour   Intake 540 ml   Output 1200 ml   Net -660 ml      General Appearance: alert, appears stated age, and cooperative  HEENT:  Head: Normocephalic, no lesions, without obvious abnormality. Neck: no adenopathy and no JVD  Lung: wheezes bilaterally  Heart: regular rate and rhythm, S1, S2 normal, no murmur, click, rub or gallop  Abdomen: soft, non-tender; bowel sounds normal; no masses,  no organomegaly  Extremities:  extremities normal, atraumatic, no cyanosis or edema  Musculokeletal: No joint swelling, no muscle tenderness. ROM normal in all joints of extremities.    Neurologic: Mental status: Alert, oriented, thought content appropriate  Subject  Pertinent Information & Imaging Studies, Consults   stacy  CBC with Differential:    Lab Results   Component Value Date/Time    WBC 4.7 12/29/2022 05:22 AM    RBC 3.88 12/29/2022 05:22 AM    HGB 12.1 12/29/2022 05:22 AM    HCT 35.3 12/29/2022 05:22 AM    PLT 74 12/29/2022 05:22 AM    MCV 91.0 12/29/2022 05:22 AM    MCH 31.2 12/29/2022 05:22 AM    MCHC 34.3 12/29/2022 05:22 AM    RDW 12.3 12/29/2022 05:22 AM    LYMPHOPCT 12.2 12/29/2022 05:22 AM    MONOPCT 3.5 12/29/2022 05:22 AM    BASOPCT 0.4 12/29/2022 05:22 AM    MONOSABS 0.19 12/29/2022 05:22 AM    LYMPHSABS 0.61 12/29/2022 05:22 AM    EOSABS 0.00 12/29/2022 05:22 AM    BASOSABS 0.00 12/29/2022 05:22 AM     BMP:    Lab Results   Component Value Date/Time     12/29/2022 05:22 AM    K 4.5 12/29/2022 05:22 AM    K 3.9 12/28/2022 03:36 PM     12/29/2022 05:22 AM    CO2 23 12/29/2022 05:22 AM    BUN 20 12/29/2022 05:22 AM    LABALBU 2.9 12/29/2022 05:22 AM    CREATININE 0.6 12/29/2022 05:22 AM    CALCIUM 8.1 12/29/2022 05:22 AM    LABGLOM >60 12/29/2022 05:22 AM    GLUCOSE 326 12/29/2022 12:54 PM       IMAGING:   Imaging Studies:    XR CHEST PORTABLE    Result Date: 12/28/2022  Bilateral lung opacities right greater than left. CTA PULMONARY W CONTRAST    Result Date: 12/28/2022  1. Severe diffuse ground-glass opacities in both lungs with relative sparing of the bilateral lung apices. The distribution is fairly symmetric. There are scattered areas of interstitial prominence. (The imaging features are most suggestive of acute lung disease in that clinical setting. ARDS could have this appearance.) 2. Prominent mediastinal and hilar lymph nodes. 3.  No evidence of pulmonary embolism. No evidence of right heart strain. 4.  Atherosclerotic disease. 5.  Remainder of the study is as above. RECOMMENDATIONS: Recommend pulmonology consultation. Follow-up chest CT in 10-12 weeks also suggested. PROCEDURES: none    FLUIDS: none      Notable Cultures:      Blood cultures No results found for: BC  Respiratory cultures No results found for: RESPCULTURE No results found for: LABGRAM  Urine No results found for: LABURIN  Legionella No results found for: LABLEGI  C Diff PCR No results found for: CDIFPCR  Wound culture/abscess: No results for input(s): WNDABS in the last 72 hours. Tip culture:No results for input(s): CXCATHTIP in the last 72 hours.      Antibiotic  Days  Day started   Rocephin  1 12/29/22     Doxycycline   1   12/29/22                  OXYGENATION: HFNC, 15 L    DIET: adult regular diet with 4 g crab choice        Resident's Assessment and Plan     Contreras Umanzor is a 47 y.o. male with  has no past medical history on file. came here with CC   Chief Complaint   Patient presents with    Shortness of Breath     Patient battling influenza since 12/23, was in respiratory distress at home today 35% RA. Days since admission: 1    Consults:   None    Assessment and plan:    Respiratory:  Acute hypoxic respiratory failure 2/2 influenzza A with superimposed CAP, potentially in setting of undiagnosed previously COPD? Pt tested positive for influenza A, but negative on the respiratory panel, on CT chest -ground glass opacities bilaterally, on physical exam-wheezing bilaterally and 15 year smoking history  Plan:  Continue doxycycline and rocephin  Start Solu-medrol 40 mg IV BID  Continue breathing tx with Duoneb  Continue tamiflu considering complicated pt condition  Will need PFT studies as outpatient after resolving the acute infection  On HFNC 15 L, titrate as needed  BiPap at night and during rest    2. Transaminates most likely 2/2 sepsis  Thromb 74, smear-WNL  Plan:  Continue monitoring, CBC daily, follow the tx plan above    3. HAGMA 2/2 lactic acidosis, resolved. LA 2.2, anion gap 12    GI:  Transaminates most likely 2/2 alcohol abuse  ALT 16,. AST 51, positive drinking hx for over 14 years with 4 beers a day  Plan:  Avoid liver toxic drugs  Continue thiamine 100 mg daily and fpolic acid 1 mg daily    Cardiovascular  Hx of HTN, not on medication, BP controlled, stable  Hx of Dyslipidemia, on lipid panel -elevated TG, not on medications    Endocrinology  Hx of DM type 2, on metformin at home  Plan:  Continue LDSS AC/HC, and POCT checks    PT/OT: not indicated  DVT ppx: on enoxaparin 40 mg SubCut  GI ppx: not indicated, on adult 4 g crab choice diet      Next of Kin/ POA:none    Code Status: full    Marily Gautam MD, PGY-1  Attending physician: Dr. Hillary Reyez       NOTE: This report was transcribed using voice recognition software.  Every effort was made to ensure accuracy; however, inadvertent computerized transcription errors may be present.

## 2022-12-29 NOTE — ED NOTES
Patient 100% on 15L NRB. Patient taken in wheelchair to bathroom. Steady on feet when taking steps to the toilet from the wheelchair. RT to bedside to ensure patient was ok on NRB.       Karyle Lins, RN  12/29/22 0645

## 2022-12-29 NOTE — ED NOTES
Order receive from MD Guillermina for repeat ABG now that patient is off bipap.      Noemy Brown RN  12/29/22 1970

## 2022-12-29 NOTE — ED NOTES
Report given to receiving nurse on 8SE. Pt en route to floor via nurse transport.       Tatianna Delgado RN  12/29/22 9781

## 2022-12-30 LAB
ALBUMIN SERPL-MCNC: 2.9 G/DL (ref 3.5–5.2)
ALBUMIN SERPL-MCNC: 2.9 G/DL (ref 3.5–5.2)
ALP BLD-CCNC: 52 U/L (ref 40–129)
ALP BLD-CCNC: 52 U/L (ref 40–129)
ALT SERPL-CCNC: 18 U/L (ref 0–40)
ALT SERPL-CCNC: 18 U/L (ref 0–40)
ANION GAP SERPL CALCULATED.3IONS-SCNC: 12 MMOL/L (ref 7–16)
ANION GAP SERPL CALCULATED.3IONS-SCNC: 12 MMOL/L (ref 7–16)
ANISOCYTOSIS: ABNORMAL
ANISOCYTOSIS: ABNORMAL
AST SERPL-CCNC: 37 U/L (ref 0–39)
AST SERPL-CCNC: 37 U/L (ref 0–39)
BASOPHILS ABSOLUTE: 0.07 E9/L (ref 0–0.2)
BASOPHILS ABSOLUTE: 0.07 E9/L (ref 0–0.2)
BASOPHILS RELATIVE PERCENT: 0.9 % (ref 0–2)
BASOPHILS RELATIVE PERCENT: 0.9 % (ref 0–2)
BILIRUB SERPL-MCNC: 0.3 MG/DL (ref 0–1.2)
BILIRUB SERPL-MCNC: 0.3 MG/DL (ref 0–1.2)
BUN BLDV-MCNC: 17 MG/DL (ref 6–20)
BUN BLDV-MCNC: 17 MG/DL (ref 6–20)
CALCIUM SERPL-MCNC: 8 MG/DL (ref 8.6–10.2)
CALCIUM SERPL-MCNC: 8 MG/DL (ref 8.6–10.2)
CHLORIDE BLD-SCNC: 104 MMOL/L (ref 98–107)
CHLORIDE BLD-SCNC: 104 MMOL/L (ref 98–107)
CO2: 24 MMOL/L (ref 22–29)
CO2: 24 MMOL/L (ref 22–29)
CREAT SERPL-MCNC: 0.6 MG/DL (ref 0.7–1.2)
CREAT SERPL-MCNC: 0.6 MG/DL (ref 0.7–1.2)
EOSINOPHILS ABSOLUTE: 0 E9/L (ref 0.05–0.5)
EOSINOPHILS ABSOLUTE: 0 E9/L (ref 0.05–0.5)
EOSINOPHILS RELATIVE PERCENT: 0 % (ref 0–6)
EOSINOPHILS RELATIVE PERCENT: 0 % (ref 0–6)
GFR SERPL CREATININE-BSD FRML MDRD: >60 ML/MIN/1.73
GFR SERPL CREATININE-BSD FRML MDRD: >60 ML/MIN/1.73
GLUCOSE BLD-MCNC: 301 MG/DL (ref 74–99)
GLUCOSE BLD-MCNC: 301 MG/DL (ref 74–99)
HCT VFR BLD CALC: 32.5 % (ref 37–54)
HCT VFR BLD CALC: 32.5 % (ref 37–54)
HEMOGLOBIN: 11.6 G/DL (ref 12.5–16.5)
HEMOGLOBIN: 11.6 G/DL (ref 12.5–16.5)
LYMPHOCYTES ABSOLUTE: 0.25 E9/L (ref 1.5–4)
LYMPHOCYTES ABSOLUTE: 0.25 E9/L (ref 1.5–4)
LYMPHOCYTES RELATIVE PERCENT: 2.6 % (ref 20–42)
LYMPHOCYTES RELATIVE PERCENT: 2.6 % (ref 20–42)
MCH RBC QN AUTO: 31.1 PG (ref 26–35)
MCH RBC QN AUTO: 31.1 PG (ref 26–35)
MCHC RBC AUTO-ENTMCNC: 35.7 % (ref 32–34.5)
MCHC RBC AUTO-ENTMCNC: 35.7 % (ref 32–34.5)
MCV RBC AUTO: 87.1 FL (ref 80–99.9)
MCV RBC AUTO: 87.1 FL (ref 80–99.9)
METER GLUCOSE: 300 MG/DL (ref 74–99)
METER GLUCOSE: 300 MG/DL (ref 74–99)
METER GLUCOSE: 316 MG/DL (ref 74–99)
METER GLUCOSE: 316 MG/DL (ref 74–99)
METER GLUCOSE: 322 MG/DL (ref 74–99)
METER GLUCOSE: 322 MG/DL (ref 74–99)
METER GLUCOSE: 375 MG/DL (ref 74–99)
METER GLUCOSE: 375 MG/DL (ref 74–99)
MONOCYTES ABSOLUTE: 0.25 E9/L (ref 0.1–0.95)
MONOCYTES ABSOLUTE: 0.25 E9/L (ref 0.1–0.95)
MONOCYTES RELATIVE PERCENT: 2.6 % (ref 2–12)
MONOCYTES RELATIVE PERCENT: 2.6 % (ref 2–12)
NEUTROPHILS ABSOLUTE: 7.8 E9/L (ref 1.8–7.3)
NEUTROPHILS ABSOLUTE: 7.8 E9/L (ref 1.8–7.3)
NEUTROPHILS RELATIVE PERCENT: 93.9 % (ref 43–80)
NEUTROPHILS RELATIVE PERCENT: 93.9 % (ref 43–80)
PDW BLD-RTO: 12.3 FL (ref 11.5–15)
PDW BLD-RTO: 12.3 FL (ref 11.5–15)
PLATELET # BLD: 116 E9/L (ref 130–450)
PLATELET # BLD: 116 E9/L (ref 130–450)
PMV BLD AUTO: 11.9 FL (ref 7–12)
PMV BLD AUTO: 11.9 FL (ref 7–12)
POIKILOCYTES: ABNORMAL
POIKILOCYTES: ABNORMAL
POTASSIUM SERPL-SCNC: 4.2 MMOL/L (ref 3.5–5)
POTASSIUM SERPL-SCNC: 4.2 MMOL/L (ref 3.5–5)
RBC # BLD: 3.73 E12/L (ref 3.8–5.8)
RBC # BLD: 3.73 E12/L (ref 3.8–5.8)
SMUDGE CELLS: ABNORMAL
SMUDGE CELLS: ABNORMAL
SODIUM BLD-SCNC: 140 MMOL/L (ref 132–146)
SODIUM BLD-SCNC: 140 MMOL/L (ref 132–146)
TARGET CELLS: ABNORMAL
TARGET CELLS: ABNORMAL
TOTAL PROTEIN: 6 G/DL (ref 6.4–8.3)
TOTAL PROTEIN: 6 G/DL (ref 6.4–8.3)
WBC # BLD: 8.3 E9/L (ref 4.5–11.5)
WBC # BLD: 8.3 E9/L (ref 4.5–11.5)

## 2022-12-30 PROCEDURE — 6360000002 HC RX W HCPCS: Performed by: INTERNAL MEDICINE

## 2022-12-30 PROCEDURE — 6370000000 HC RX 637 (ALT 250 FOR IP): Performed by: STUDENT IN AN ORGANIZED HEALTH CARE EDUCATION/TRAINING PROGRAM

## 2022-12-30 PROCEDURE — 2500000003 HC RX 250 WO HCPCS

## 2022-12-30 PROCEDURE — 99233 SBSQ HOSP IP/OBS HIGH 50: CPT | Performed by: INTERNAL MEDICINE

## 2022-12-30 PROCEDURE — 94660 CPAP INITIATION&MGMT: CPT

## 2022-12-30 PROCEDURE — 2580000003 HC RX 258

## 2022-12-30 PROCEDURE — 2580000003 HC RX 258: Performed by: INTERNAL MEDICINE

## 2022-12-30 PROCEDURE — 82962 GLUCOSE BLOOD TEST: CPT

## 2022-12-30 PROCEDURE — 2700000000 HC OXYGEN THERAPY PER DAY

## 2022-12-30 PROCEDURE — 80053 COMPREHEN METABOLIC PANEL: CPT

## 2022-12-30 PROCEDURE — 36415 COLL VENOUS BLD VENIPUNCTURE: CPT

## 2022-12-30 PROCEDURE — 94640 AIRWAY INHALATION TREATMENT: CPT

## 2022-12-30 PROCEDURE — 6370000000 HC RX 637 (ALT 250 FOR IP)

## 2022-12-30 PROCEDURE — 6370000000 HC RX 637 (ALT 250 FOR IP): Performed by: INTERNAL MEDICINE

## 2022-12-30 PROCEDURE — 2060000000 HC ICU INTERMEDIATE R&B

## 2022-12-30 PROCEDURE — C9113 INJ PANTOPRAZOLE SODIUM, VIA: HCPCS | Performed by: INTERNAL MEDICINE

## 2022-12-30 PROCEDURE — 6370000000 HC RX 637 (ALT 250 FOR IP): Performed by: CHIROPRACTOR

## 2022-12-30 PROCEDURE — 85025 COMPLETE CBC W/AUTO DIFF WBC: CPT

## 2022-12-30 PROCEDURE — 6360000002 HC RX W HCPCS

## 2022-12-30 PROCEDURE — S5553 INSULIN LONG ACTING 5 U: HCPCS | Performed by: CHIROPRACTOR

## 2022-12-30 RX ORDER — SODIUM CHLORIDE 9 MG/ML
INJECTION, SOLUTION INTRAVENOUS PRN
Status: DISCONTINUED | OUTPATIENT
Start: 2022-12-30 | End: 2023-01-05 | Stop reason: HOSPADM

## 2022-12-30 RX ORDER — SODIUM CHLORIDE 0.9 % (FLUSH) 0.9 %
5-40 SYRINGE (ML) INJECTION PRN
Status: DISCONTINUED | OUTPATIENT
Start: 2022-12-30 | End: 2023-01-05 | Stop reason: HOSPADM

## 2022-12-30 RX ORDER — SODIUM CHLORIDE 0.9 % (FLUSH) 0.9 %
5-40 SYRINGE (ML) INJECTION EVERY 12 HOURS SCHEDULED
Status: DISCONTINUED | OUTPATIENT
Start: 2022-12-30 | End: 2023-01-05 | Stop reason: HOSPADM

## 2022-12-30 RX ORDER — INSULIN LISPRO 100 [IU]/ML
0-4 INJECTION, SOLUTION INTRAVENOUS; SUBCUTANEOUS NIGHTLY
Status: DISCONTINUED | OUTPATIENT
Start: 2022-12-30 | End: 2022-12-31 | Stop reason: ALTCHOICE

## 2022-12-30 RX ORDER — INSULIN LISPRO 100 [IU]/ML
0-16 INJECTION, SOLUTION INTRAVENOUS; SUBCUTANEOUS
Status: DISCONTINUED | OUTPATIENT
Start: 2022-12-31 | End: 2022-12-31

## 2022-12-30 RX ADMIN — INSULIN LISPRO 6 UNITS: 100 INJECTION, SOLUTION INTRAVENOUS; SUBCUTANEOUS at 13:13

## 2022-12-30 RX ADMIN — SODIUM CHLORIDE, PRESERVATIVE FREE 40 MG: 5 INJECTION INTRAVENOUS at 10:02

## 2022-12-30 RX ADMIN — SODIUM CHLORIDE, PRESERVATIVE FREE 10 ML: 5 INJECTION INTRAVENOUS at 20:57

## 2022-12-30 RX ADMIN — OSELTAMIVIR PHOSPHATE 75 MG: 75 CAPSULE ORAL at 09:34

## 2022-12-30 RX ADMIN — DOXYCYCLINE 100 MG: 100 INJECTION, POWDER, LYOPHILIZED, FOR SOLUTION INTRAVENOUS at 09:54

## 2022-12-30 RX ADMIN — OSELTAMIVIR PHOSPHATE 75 MG: 75 CAPSULE ORAL at 20:51

## 2022-12-30 RX ADMIN — SODIUM CHLORIDE, PRESERVATIVE FREE 10 ML: 5 INJECTION INTRAVENOUS at 09:34

## 2022-12-30 RX ADMIN — IPRATROPIUM BROMIDE AND ALBUTEROL SULFATE 1 AMPULE: .5; 2.5 SOLUTION RESPIRATORY (INHALATION) at 13:46

## 2022-12-30 RX ADMIN — INSULIN LISPRO 6 UNITS: 100 INJECTION, SOLUTION INTRAVENOUS; SUBCUTANEOUS at 09:59

## 2022-12-30 RX ADMIN — INSULIN LISPRO 6 UNITS: 100 INJECTION, SOLUTION INTRAVENOUS; SUBCUTANEOUS at 18:01

## 2022-12-30 RX ADMIN — IPRATROPIUM BROMIDE AND ALBUTEROL SULFATE 1 AMPULE: .5; 2.5 SOLUTION RESPIRATORY (INHALATION) at 20:39

## 2022-12-30 RX ADMIN — IPRATROPIUM BROMIDE AND ALBUTEROL SULFATE 1 AMPULE: .5; 2.5 SOLUTION RESPIRATORY (INHALATION) at 16:39

## 2022-12-30 RX ADMIN — ARFORMOTEROL TARTRATE 15 MCG: 15 SOLUTION RESPIRATORY (INHALATION) at 20:39

## 2022-12-30 RX ADMIN — BUDESONIDE 250 MCG: 0.25 INHALANT ORAL at 20:39

## 2022-12-30 RX ADMIN — DOXYCYCLINE 100 MG: 100 INJECTION, POWDER, LYOPHILIZED, FOR SOLUTION INTRAVENOUS at 20:49

## 2022-12-30 RX ADMIN — INSULIN LISPRO 4 UNITS: 100 INJECTION, SOLUTION INTRAVENOUS; SUBCUTANEOUS at 20:53

## 2022-12-30 RX ADMIN — BUDESONIDE 250 MCG: 0.25 INHALANT ORAL at 10:05

## 2022-12-30 RX ADMIN — ARFORMOTEROL TARTRATE 15 MCG: 15 SOLUTION RESPIRATORY (INHALATION) at 10:04

## 2022-12-30 RX ADMIN — CEFTRIAXONE 1000 MG: 1 INJECTION, POWDER, FOR SOLUTION INTRAMUSCULAR; INTRAVENOUS at 23:20

## 2022-12-30 RX ADMIN — Medication 1 MG: at 09:34

## 2022-12-30 RX ADMIN — IPRATROPIUM BROMIDE AND ALBUTEROL SULFATE 1 AMPULE: .5; 2.5 SOLUTION RESPIRATORY (INHALATION) at 10:06

## 2022-12-30 RX ADMIN — ACETAMINOPHEN 650 MG: 325 TABLET, FILM COATED ORAL at 06:45

## 2022-12-30 RX ADMIN — ENOXAPARIN SODIUM 40 MG: 100 INJECTION SUBCUTANEOUS at 09:34

## 2022-12-30 RX ADMIN — Medication 100 MG: at 09:34

## 2022-12-30 RX ADMIN — METHYLPREDNISOLONE SODIUM SUCCINATE 40 MG: 40 INJECTION, POWDER, FOR SOLUTION INTRAMUSCULAR; INTRAVENOUS at 23:20

## 2022-12-30 RX ADMIN — ACETAMINOPHEN 650 MG: 325 TABLET, FILM COATED ORAL at 23:23

## 2022-12-30 RX ADMIN — INSULIN GLARGINE-YFGN 10 UNITS: 100 INJECTION, SOLUTION SUBCUTANEOUS at 20:53

## 2022-12-30 RX ADMIN — METHYLPREDNISOLONE SODIUM SUCCINATE 40 MG: 40 INJECTION, POWDER, FOR SOLUTION INTRAMUSCULAR; INTRAVENOUS at 13:13

## 2022-12-30 NOTE — CONSULTS
Full Note dictated  Acute hypoxic resp failure  ARDS   Influenza A  Probable copd acute exac  Mediastinal adenopathy with ground glass infiltrates  For now with uncontrolled dm, would be reluctant to raise steroids otherwise agree with present approach. Low threshold for ICU transfer.

## 2022-12-30 NOTE — CARE COORDINATION
[facility] TRANSITION OF CARE PLANNINGDanny Crouch, [address]):  pt lives alone and was independent  with all adl's pta. He was working pta. Pt has 2 grown children with a daughter local who works and a son that is in [place], [place]. The pcp is dr. Cindy Grossman from [facility] on [address] in [place]. Pt is not a . He lives in a ranch home with 7 side door steps to enter the home. No hhc pta. Pt has used fww, w/c and cane that he collects from [employer]. Pt is diabetic not on insulin pta and has a working glucometer. The plan is home to pt's girlfriends home in [place], [place]. I left a dme list for pt since he Is currently on 15l hf o2. Pt wants FMLA papers filled out. I asked for him to email them to me and I would leave them on the soft chart and ask dr. Emanuel Pugh to fill them out if not he is to take them to his pcp once discharged.  Jefferson Da Silva, KACI  12/30/2022

## 2022-12-30 NOTE — PROGRESS NOTES
[facility]  Internal Medicine Department    Attending Physician Statement:  Merlin Taylor. KATHY. I have discussed the case, including pertinent history and exam findings with the resident. I have reviewed all past medical history, past surgical history, family history, social history, medications, and allergies and updated as appropriate in the history section of the chart. I have seen and examined the patient and the key elements of the encounter have been performed by me. I agree with the assessment, plan and orders as documented by the resident. Acute Hypoxic Respiratory Failure 2/2 likely COPD exacerbation 2/2 Influenza and CAP   -improving and was able to be titrated off bipap to nasal canula   -undiagnosed COPD; does not use any inhalers at home  -tobacco abuse 1.5 ppd   -ground glass opaccities on CTA; atelectasis vs consolidation in lower lobe  -continue ceftriaxone and doxycycline, continue tamiflu  -aggressive pulmonary toilet  -bronchodilators and LABA/ICS  -BiPAP at night and during rest   -Pulmonology consult today 2/2 continued hypoxia while using 15 L NC; input appreciated     NIDDM2  -does not use insulin at home  -hyperglycemia 2/2 steroids   -starting lantus today and continue sliding scale insulin   -titrate insulin as needed     Thrombocytopenia  -likely 2/2 combination of alcohol use and infection   -blood work pending     Alcohol Use  -vitamin supplementation   -monitor for withdrawal symptoms     DVT and GI proplylaxis     Remainder of medical problems as per resident note.

## 2022-12-30 NOTE — CARE COORDINATION
Case reviewed with Dr Freeman Quinones. Patient will likely need Home O2, NIV and HHC at discharge. Patient will also need a new PCP set up at discharge. As this is the case, the group will follow the [facility]. orders at discharge until the patient is able to establish with the new primary care provider. Dr Sasha Emanuel will be the attending the next two weeks. Call placed to [facility] and detailed VM left with referral information and number for call back. Explained above and that patient is not ready for discharge. Await return call with acceptance. Will continue to follow.      Peggy Lilly RN.  Smith Johnson  [phone]

## 2022-12-30 NOTE — PROGRESS NOTES
[facility]  Internal Medicine Residency Program  Progress Note - House Team     Patient:  Ashley Sanders 47 y.o. male MRN: [id number]     Date of Service: 12/30/2022     CC: SOB  Overnight events: Blood glucose in 300s-400s after starting steroids, Lantus 10 started. Not in DKA BHB and BMP checked. Anxious but wearing BiPAP one-time Vistaril 25 mg given    Subjective     Patient was seen and examined this morning at bedside in no acute distress. Patient is concerned about the possibility of getting intubated. Patient reassured that he is on the right path but keep it up. Patient is urinating well. Does not complain of any fever, chills, headache. No chest pain or shortness of breath but is on 15 LNC. Patient denies any abdominal pain or problems eating. Objective     Physical Exam:  Vitals: /77   Pulse 79   Temp 98.2 °F (36.8 °C) (Temporal)   Resp 21   Ht 6' 1\" (1.854 m)   Wt 180 lb (81.6 kg)   SpO2 94%   BMI 23.75 kg/m²     I & O - 24hr: No intake/output data recorded. Net Balance:       General Appearance: alert, appears stated age, and cooperative  HEENT:  Head: Normocephalic, no lesions, without obvious abnormality. Neck: no JVD  Lung: Crackles on lower left side  Heart: regular rate and rhythm, S1, S2 normal, no murmur, click, rub or gallop  Abdomen: soft, non-tender; bowel sounds normal; no masses,  no organomegaly  Extremities:  extremities normal, atraumatic, no cyanosis or edema  Musculokeletal: No joint swelling, no muscle tenderness. ROM normal in all joints of extremities.    Neurologic: Mental status: Alert, oriented, thought content appropriate  Other:       Pertinent Labs & Imaging Studies     Basic Labs:    Complete Blood Count:   Recent Labs     12/28/22  2244 12/29/22  0522 12/30/22  0444   WBC 5.0 4.7 8.3   HGB 12.4* 12.1* 11.6*   HCT 35.1* 35.3* 32.5*   PLT 71* 74* 116*        Last 3 Blood Glucose:   Recent Labs     12/29/22  1254 12/29/22 2033 12/30/22  0444   GLUCOSE 326 353* 301*        PT/INR:    Lab Results   Component Value Date/Time    PROTIME 13.1 12/28/2022 03:36 PM    INR 1.2 12/28/2022 03:36 PM     PTT:  No results found for: APTT, PTT    Comprehensive Metabolic Profile:   Recent Labs     12/29/22  0522 12/29/22  1254 12/29/22  2033 12/30/22  0444     --  137 140   K 4.5  --  4.3 4.2     --  102 104   CO2 23  --  23 24   BUN 20  --  19 17   CREATININE 0.6*  --  0.6* 0.6*   GLUCOSE 322* 326 353* 301*   CALCIUM 8.1*  --  8.2* 8.0*   PROT 6.6  --   --  6.0*   LABALBU 2.9*  --   --  2.9*   BILITOT 0.3  --   --  0.3   ALKPHOS 43  --   --  52   AST 51*  --   --  37   ALT 16  --   --  18      Magnesium: No results found for: MG  Phosphorus: No results found for: PHOS  Ionized Calcium: No results found for: CAION   Troponin: No results for input(s): TROPONINI in the last 72 hours.     Imaging Studies:        Resident's Assessment and Plan     SUMMARY OF HOSPITAL STAY:       Consults:   Pulmonology    Assessment and Plan:    Acute hypoxic respiratory failure 2/2 influenza A w/ superimposed CAP in the setting of likely acute COPD exacerbation  CTA showed severe b/l diffuse groundglass opacities  Breathing tx Brovana, pulmicort, duoneb, incentive spirometer    Resp panel, legionella neg,   Ceftriaxone and doxycycline for CAP coverage with methylprednisolone 40 twice daily  Follow respiratory cultures, blood cultures 12/28 NGTD, urine cultures, MRSA nares  Continue BiPAP at night and sleeping, Vistaril 25 as needed if anxious on BiPAP  Appreciate pulmonary rec, will follow with pulm outpatient  Influenza A, pos since 12/24  Tamiflu 12/28-1/2  Hx of HTN - previously on lisinopril, not taking  Hx of HLD - not taking home statins   Transaminitis likely 2/2 alcohol use, resolved - alcohol pattern  HAGMA 2/2 lactic acidosis, resolved cont thiamine 596 qd & folic acid 1mg qd  NIDDM - previously on metformin and glipizide, has not taken meds in a yr  AC/HS w/ 10 glargine, MDSS  Blood glucose rising likely due to steroids  Thrombocytopenia likely 2/2 sepsi, PBS wnl   Hx of tobacco abuse - smokes 1-1.5ppd since he was young, nicotine patch as needed  Hx of alcohol  abuse - drinks 2-3 beers per days for the last 30 yrs  Not currently on CIWA protocol, thiamine and folic acid  Monitor for signs of withdrawal    PT/OT evaluation: Not indicated at this time  DVT prophylaxis: Lovenox 40  GI prophylaxis: Protonix 40  Bowel regimen: As needed  Pain management: As needed  Disposition: continue current care /will likely need HHC, O2, and IV on discharge, will follow with pulmonology outpatient and get PFTs for likely COPD    Joaquín Ludwig MD, PGY-1  Attending physician: Dr. Freeman Quinones

## 2022-12-30 NOTE — CONSULTS
[address]                  [address] [facility],  [facility]                                  CONSULTATION    PATIENT NAME: Dick Mendoza                   :        1970  MED REC NO:   [id number]                            ROOM:       8513  ACCOUNT NO:   [de-identified]                           ADMIT DATE: 2022  PROVIDER:     Nydia Cruz MD    CONSULT DATE:  2022    PULMONARY CONSULTATION    REFERRING PROVIDER:  Dr. Placido Funk for respiratory failure. IMPRESSION:  1. Acute hypoxic respiratory failure, currently on 15 liters or BiPAP  10/5.  2.  ARDS. 3.  Influenza A. He also has an acute exacerbation of COPD probably. 4.  Mediastinal adenopathy which I suspect is reactive but will likely  need a repeat CT scan down the road. At this point, with his  uncontrolled blood sugars, I am reluctant to increase the steroids,  otherwise would just continue current therapy. He is on Tamiflu  although it is little bit late as well as community acquired pneumonia  therapy with doxycycline and ceftriaxone both of which I agree with. The nebulizer regimen of budesonide or formoterol and DuoNeb q.4 while  awake is reasonable. History is obtained from interviewing the patient and reviewing the [address] [place] [address]'s Emergency Department evaluation. He is a 13-year-old long-term  smoker who starts feeling ill a week ago. He felt out of it and  subsequently passed out. He went to [address] [place] [address] where he was diagnosed  with influenza A, not felt to be a Tamiflu candidate. By Tuesday or  Wednesday of this week, he became disoriented apparently and had oxygen  desaturation. EMS was activated and he was brought to this hospital.   He has been short of breath with walking. He has a dry cough, slight  wheeze but no chest pain. He was febrile as high as 104.   He has had  progressive worsening hypoxia and is now on 15 liters of O satting,  acceptably on that and comfortable. He is a one-and-a-half pack a day  smoker. PAST MEDICAL HISTORY:  Includes diabetes, hypertension, hyperlipidemia. PAST SURGICAL HISTORY:  He is status post herniorrhaphy and remote  tonsillectomy. FAMILY HISTORY:  His father had COPD and he should have an alpha 1 antitrypsin level as an  outpatient. SOCIAL HISTORY:  Smokes as above. Works as a supervisor for [employer]. CURRENT MEDICATIONS:  Currently besides the BiPAP are Brovana 15 b.i.d.,  Pulmicort 250 b.i.d., ceftriaxone 1 gm daily, doxycycline 100 q.12,  Lovenox 40 subcu daily, folic acid 1 mg daily, glargine-yfgn 10 units  nightly sliding scale coverage, DuoNeb q.4 while awake, melatonin 5 mg  nightly p.r.n., Solu-Medrol 40 IV q.12, Nicoderm patch 21 mg daily,  Zofran p.r.n., Tamiflu 75 b.i.d., Protonix 40 daily and thiamine 100  daily. ALLERGIES:  No allergies. REVIEW OF SYSTEMS:  Positive fever. Positive eye glass use. Some  hearing loss. No chest pain. He had nausea and vomiting when the acute  illness began. REVIEW OF SYSTEMS:  Otherwise negative. PHYSICAL EXAMINATION:  GENERAL:  He is mildly diaphoretic but no acute distress. VITAL SIGNS:  Temperature 98.2. He has been afebrile since 12/28/2022,  pulse 79, respiratory rate was 21, blood pressure last checked was  122/81, pulse ox last checked on 15 liters was 94. SKIN:  Clammy, no rashes. HEENT:  Eyes had clear sclerae. Teeth and palate were normal.  NECK:  Without masses or adenopathy. CHEST:  Symmetric. There was no accessory muscle use. HEART:  Regular. LUNGS:  Had scattered crackles. ABDOMEN:  Soft, nontender without masses or organomegaly. EXTREMITIES:  Showed no clubbing, no cyanosis, and no limb edema. NEUROLOGIC:  He is alert and appropriate. The CT images of the chest reviewed.   There is extensive mediastinal  adenopathy, more than I would expect and extensive ground-glass changes  which I think in this case are ARDS.  Chemistries from today, BUN is 17,  creatinine 0.6, glucose was 301. White count 8.3, hemoglobin is 11.6,  platelets of 186,975. Respiratory panel which is positive for  influenza. Most recent ABG on 12/29/2022 was 7.452/30.9/77.7 on 15  liters.         Shelbi Lee MD    D: 12/30/2022 11:41:09       T: 12/30/2022 11:44:34     [email or web address]  Job#: [id number]     Doc#: [id number]    CC:

## 2022-12-30 NOTE — PROGRESS NOTES
12/29/22 6860   Settings/Measurements   PIP Observed 19 cm H20   IPAP 18 cmH20   CPAP/EPAP 8 cmH2O   Vt (Measured) 507 mL   Rate Ordered 14   Resp 27   Insp Rise Time (%) 2 %   FiO2  60 %   I Time/ I Time % 1 s   Minute Volume (L/min) 14.3 Liters   Mask Leak (lpm) 94 lpm     Skin barrier is on pt: no redness or irritation

## 2022-12-31 ENCOUNTER — APPOINTMENT (OUTPATIENT)
Dept: GENERAL RADIOLOGY | Age: 52
DRG: 871 | End: 2022-12-31
Payer: COMMERCIAL

## 2022-12-31 LAB
ALBUMIN SERPL-MCNC: 3.1 G/DL (ref 3.5–5.2)
ALBUMIN SERPL-MCNC: 3.1 G/DL (ref 3.5–5.2)
ALP BLD-CCNC: 55 U/L (ref 40–129)
ALP BLD-CCNC: 55 U/L (ref 40–129)
ALT SERPL-CCNC: 34 U/L (ref 0–40)
ALT SERPL-CCNC: 34 U/L (ref 0–40)
ANION GAP SERPL CALCULATED.3IONS-SCNC: 10 MMOL/L (ref 7–16)
ANION GAP SERPL CALCULATED.3IONS-SCNC: 10 MMOL/L (ref 7–16)
ANISOCYTOSIS: ABNORMAL
ANISOCYTOSIS: ABNORMAL
AST SERPL-CCNC: 50 U/L (ref 0–39)
AST SERPL-CCNC: 50 U/L (ref 0–39)
B.E.: 1.4 MMOL/L (ref -3–3)
B.E.: 1.4 MMOL/L (ref -3–3)
BASOPHILS ABSOLUTE: 0 E9/L (ref 0–0.2)
BASOPHILS ABSOLUTE: 0 E9/L (ref 0–0.2)
BASOPHILS RELATIVE PERCENT: 0.1 % (ref 0–2)
BASOPHILS RELATIVE PERCENT: 0.1 % (ref 0–2)
BILIRUB SERPL-MCNC: 0.5 MG/DL (ref 0–1.2)
BILIRUB SERPL-MCNC: 0.5 MG/DL (ref 0–1.2)
BUN BLDV-MCNC: 17 MG/DL (ref 6–20)
BUN BLDV-MCNC: 17 MG/DL (ref 6–20)
CALCIUM SERPL-MCNC: 8.5 MG/DL (ref 8.6–10.2)
CALCIUM SERPL-MCNC: 8.5 MG/DL (ref 8.6–10.2)
CHLORIDE BLD-SCNC: 101 MMOL/L (ref 98–107)
CHLORIDE BLD-SCNC: 101 MMOL/L (ref 98–107)
CO2: 26 MMOL/L (ref 22–29)
CO2: 26 MMOL/L (ref 22–29)
COHB: 0.6 % (ref 0–1.5)
COHB: 0.6 % (ref 0–1.5)
COMMENT: ABNORMAL
COMMENT: ABNORMAL
CREAT SERPL-MCNC: 0.6 MG/DL (ref 0.7–1.2)
CREAT SERPL-MCNC: 0.6 MG/DL (ref 0.7–1.2)
CRITICAL: ABNORMAL
CRITICAL: ABNORMAL
DATE ANALYZED: ABNORMAL
DATE ANALYZED: ABNORMAL
DATE OF COLLECTION: ABNORMAL
DATE OF COLLECTION: ABNORMAL
EOSINOPHILS ABSOLUTE: 0 E9/L (ref 0.05–0.5)
EOSINOPHILS ABSOLUTE: 0 E9/L (ref 0.05–0.5)
EOSINOPHILS RELATIVE PERCENT: 0.1 % (ref 0–6)
EOSINOPHILS RELATIVE PERCENT: 0.1 % (ref 0–6)
GFR SERPL CREATININE-BSD FRML MDRD: >60 ML/MIN/1.73
GFR SERPL CREATININE-BSD FRML MDRD: >60 ML/MIN/1.73
GLUCOSE BLD-MCNC: 268 MG/DL (ref 74–99)
GLUCOSE BLD-MCNC: 268 MG/DL (ref 74–99)
HCO3: 24 MMOL/L (ref 22–26)
HCO3: 24 MMOL/L (ref 22–26)
HCT VFR BLD CALC: 33.7 % (ref 37–54)
HCT VFR BLD CALC: 33.7 % (ref 37–54)
HEMOGLOBIN: 11.5 G/DL (ref 12.5–16.5)
HEMOGLOBIN: 11.5 G/DL (ref 12.5–16.5)
HHB: 7.9 % (ref 0–5)
HHB: 7.9 % (ref 0–5)
LAB: ABNORMAL
LAB: ABNORMAL
LYMPHOCYTES ABSOLUTE: 0.59 E9/L (ref 1.5–4)
LYMPHOCYTES ABSOLUTE: 0.59 E9/L (ref 1.5–4)
LYMPHOCYTES RELATIVE PERCENT: 7.8 % (ref 20–42)
LYMPHOCYTES RELATIVE PERCENT: 7.8 % (ref 20–42)
Lab: ABNORMAL
Lab: ABNORMAL
Lab: NORMAL
Lab: NORMAL
MCH RBC QN AUTO: 31.1 PG (ref 26–35)
MCH RBC QN AUTO: 31.1 PG (ref 26–35)
MCHC RBC AUTO-ENTMCNC: 34.1 % (ref 32–34.5)
MCHC RBC AUTO-ENTMCNC: 34.1 % (ref 32–34.5)
MCV RBC AUTO: 91.1 FL (ref 80–99.9)
MCV RBC AUTO: 91.1 FL (ref 80–99.9)
METER GLUCOSE: 155 MG/DL (ref 74–99)
METER GLUCOSE: 155 MG/DL (ref 74–99)
METER GLUCOSE: 298 MG/DL (ref 74–99)
METER GLUCOSE: 439 MG/DL (ref 74–99)
METER GLUCOSE: 439 MG/DL (ref 74–99)
METHB: 0.4 % (ref 0–1.5)
METHB: 0.4 % (ref 0–1.5)
MODE: ABNORMAL
MODE: ABNORMAL
MONOCYTES ABSOLUTE: 0.3 E9/L (ref 0.1–0.95)
MONOCYTES ABSOLUTE: 0.3 E9/L (ref 0.1–0.95)
MONOCYTES RELATIVE PERCENT: 3.5 % (ref 2–12)
MONOCYTES RELATIVE PERCENT: 3.5 % (ref 2–12)
NEUTROPHILS ABSOLUTE: 6.59 E9/L (ref 1.8–7.3)
NEUTROPHILS ABSOLUTE: 6.59 E9/L (ref 1.8–7.3)
NEUTROPHILS RELATIVE PERCENT: 88.7 % (ref 43–80)
NEUTROPHILS RELATIVE PERCENT: 88.7 % (ref 43–80)
O2 SATURATION: 92 % (ref 92–98.5)
O2 SATURATION: 92 % (ref 92–98.5)
O2HB: 91.1 % (ref 94–97)
O2HB: 91.1 % (ref 94–97)
OPERATOR ID: 1768
OPERATOR ID: 1768
PATIENT TEMP: 37 C
PATIENT TEMP: 37 C
PCO2: 31.6 MMHG (ref 35–45)
PCO2: 31.6 MMHG (ref 35–45)
PDW BLD-RTO: 12.2 FL (ref 11.5–15)
PDW BLD-RTO: 12.2 FL (ref 11.5–15)
PH BLOOD GAS: 7.5 (ref 7.35–7.45)
PH BLOOD GAS: 7.5 (ref 7.35–7.45)
PLATELET # BLD: 130 E9/L (ref 130–450)
PLATELET # BLD: 130 E9/L (ref 130–450)
PMV BLD AUTO: 11.2 FL (ref 7–12)
PMV BLD AUTO: 11.2 FL (ref 7–12)
PO2: 63.1 MMHG (ref 75–100)
PO2: 63.1 MMHG (ref 75–100)
POLYCHROMASIA: ABNORMAL
POLYCHROMASIA: ABNORMAL
POTASSIUM SERPL-SCNC: 4.4 MMOL/L (ref 3.5–5)
POTASSIUM SERPL-SCNC: 4.4 MMOL/L (ref 3.5–5)
RBC # BLD: 3.7 E12/L (ref 3.8–5.8)
RBC # BLD: 3.7 E12/L (ref 3.8–5.8)
REPORT: NORMAL
REPORT: NORMAL
SODIUM BLD-SCNC: 137 MMOL/L (ref 132–146)
SODIUM BLD-SCNC: 137 MMOL/L (ref 132–146)
SOURCE, BLOOD GAS: ABNORMAL
SOURCE, BLOOD GAS: ABNORMAL
TARGET CELLS: ABNORMAL
TARGET CELLS: ABNORMAL
THB: 12.4 G/DL (ref 11.5–16.5)
THB: 12.4 G/DL (ref 11.5–16.5)
THIS TEST SENT TO: NORMAL
THIS TEST SENT TO: NORMAL
TIME ANALYZED: 1407
TIME ANALYZED: 1407
TOTAL PROTEIN: 6.1 G/DL (ref 6.4–8.3)
TOTAL PROTEIN: 6.1 G/DL (ref 6.4–8.3)
URINE CULTURE, ROUTINE: NORMAL
URINE CULTURE, ROUTINE: NORMAL
WBC # BLD: 7.4 E9/L (ref 4.5–11.5)
WBC # BLD: 7.4 E9/L (ref 4.5–11.5)

## 2022-12-31 PROCEDURE — 6360000002 HC RX W HCPCS: Performed by: INTERNAL MEDICINE

## 2022-12-31 PROCEDURE — 2700000000 HC OXYGEN THERAPY PER DAY

## 2022-12-31 PROCEDURE — S5553 INSULIN LONG ACTING 5 U: HCPCS | Performed by: STUDENT IN AN ORGANIZED HEALTH CARE EDUCATION/TRAINING PROGRAM

## 2022-12-31 PROCEDURE — 6370000000 HC RX 637 (ALT 250 FOR IP)

## 2022-12-31 PROCEDURE — 82805 BLOOD GASES W/O2 SATURATION: CPT

## 2022-12-31 PROCEDURE — 82962 GLUCOSE BLOOD TEST: CPT

## 2022-12-31 PROCEDURE — 71045 X-RAY EXAM CHEST 1 VIEW: CPT

## 2022-12-31 PROCEDURE — 99232 SBSQ HOSP IP/OBS MODERATE 35: CPT | Performed by: INTERNAL MEDICINE

## 2022-12-31 PROCEDURE — 36600 WITHDRAWAL OF ARTERIAL BLOOD: CPT

## 2022-12-31 PROCEDURE — 2500000003 HC RX 250 WO HCPCS

## 2022-12-31 PROCEDURE — 6360000002 HC RX W HCPCS

## 2022-12-31 PROCEDURE — 6370000000 HC RX 637 (ALT 250 FOR IP): Performed by: STUDENT IN AN ORGANIZED HEALTH CARE EDUCATION/TRAINING PROGRAM

## 2022-12-31 PROCEDURE — 94640 AIRWAY INHALATION TREATMENT: CPT

## 2022-12-31 PROCEDURE — 2580000003 HC RX 258: Performed by: INTERNAL MEDICINE

## 2022-12-31 PROCEDURE — 85025 COMPLETE CBC W/AUTO DIFF WBC: CPT

## 2022-12-31 PROCEDURE — C9113 INJ PANTOPRAZOLE SODIUM, VIA: HCPCS | Performed by: INTERNAL MEDICINE

## 2022-12-31 PROCEDURE — 36415 COLL VENOUS BLD VENIPUNCTURE: CPT

## 2022-12-31 PROCEDURE — A4216 STERILE WATER/SALINE, 10 ML: HCPCS | Performed by: INTERNAL MEDICINE

## 2022-12-31 PROCEDURE — 94660 CPAP INITIATION&MGMT: CPT

## 2022-12-31 PROCEDURE — 82103 ALPHA-1-ANTITRYPSIN TOTAL: CPT

## 2022-12-31 PROCEDURE — 2580000003 HC RX 258

## 2022-12-31 PROCEDURE — 2060000000 HC ICU INTERMEDIATE R&B

## 2022-12-31 PROCEDURE — 80053 COMPREHEN METABOLIC PANEL: CPT

## 2022-12-31 RX ORDER — INSULIN LISPRO 100 [IU]/ML
0-4 INJECTION, SOLUTION INTRAVENOUS; SUBCUTANEOUS NIGHTLY
Status: DISCONTINUED | OUTPATIENT
Start: 2022-12-31 | End: 2023-01-05 | Stop reason: HOSPADM

## 2022-12-31 RX ORDER — INSULIN GLARGINE-YFGN 100 [IU]/ML
20 INJECTION, SOLUTION SUBCUTANEOUS NIGHTLY
Status: DISCONTINUED | OUTPATIENT
Start: 2022-12-31 | End: 2023-01-01

## 2022-12-31 RX ORDER — INSULIN GLARGINE-YFGN 100 [IU]/ML
16 INJECTION, SOLUTION SUBCUTANEOUS NIGHTLY
Status: DISCONTINUED | OUTPATIENT
Start: 2022-12-31 | End: 2022-12-31

## 2022-12-31 RX ORDER — INSULIN LISPRO 100 [IU]/ML
0-8 INJECTION, SOLUTION INTRAVENOUS; SUBCUTANEOUS
Status: DISCONTINUED | OUTPATIENT
Start: 2022-12-31 | End: 2023-01-05 | Stop reason: HOSPADM

## 2022-12-31 RX ADMIN — MELATONIN 3 MG ORAL TABLET 3 MG: 3 TABLET ORAL at 21:07

## 2022-12-31 RX ADMIN — BUDESONIDE 250 MCG: 0.25 INHALANT ORAL at 10:20

## 2022-12-31 RX ADMIN — Medication 1 MG: at 09:17

## 2022-12-31 RX ADMIN — DOXYCYCLINE 100 MG: 100 INJECTION, POWDER, LYOPHILIZED, FOR SOLUTION INTRAVENOUS at 21:09

## 2022-12-31 RX ADMIN — ARFORMOTEROL TARTRATE 15 MCG: 15 SOLUTION RESPIRATORY (INHALATION) at 10:19

## 2022-12-31 RX ADMIN — BUDESONIDE 250 MCG: 0.25 INHALANT ORAL at 20:37

## 2022-12-31 RX ADMIN — IPRATROPIUM BROMIDE AND ALBUTEROL SULFATE 1 AMPULE: .5; 2.5 SOLUTION RESPIRATORY (INHALATION) at 20:37

## 2022-12-31 RX ADMIN — CEFTRIAXONE 1000 MG: 1 INJECTION, POWDER, FOR SOLUTION INTRAMUSCULAR; INTRAVENOUS at 22:10

## 2022-12-31 RX ADMIN — Medication 100 MG: at 09:16

## 2022-12-31 RX ADMIN — INSULIN LISPRO 8 UNITS: 100 INJECTION, SOLUTION INTRAVENOUS; SUBCUTANEOUS at 13:27

## 2022-12-31 RX ADMIN — INSULIN LISPRO 4 UNITS: 100 INJECTION, SOLUTION INTRAVENOUS; SUBCUTANEOUS at 09:19

## 2022-12-31 RX ADMIN — IPRATROPIUM BROMIDE AND ALBUTEROL SULFATE 1 AMPULE: .5; 2.5 SOLUTION RESPIRATORY (INHALATION) at 10:21

## 2022-12-31 RX ADMIN — OSELTAMIVIR PHOSPHATE 75 MG: 75 CAPSULE ORAL at 09:15

## 2022-12-31 RX ADMIN — SODIUM CHLORIDE, PRESERVATIVE FREE 10 ML: 5 INJECTION INTRAVENOUS at 09:30

## 2022-12-31 RX ADMIN — INSULIN GLARGINE-YFGN 20 UNITS: 100 INJECTION, SOLUTION SUBCUTANEOUS at 21:09

## 2022-12-31 RX ADMIN — ENOXAPARIN SODIUM 40 MG: 100 INJECTION SUBCUTANEOUS at 09:14

## 2022-12-31 RX ADMIN — INSULIN HUMAN 10 UNITS: 100 INJECTION, SOLUTION PARENTERAL at 13:47

## 2022-12-31 RX ADMIN — SODIUM CHLORIDE, PRESERVATIVE FREE 40 MG: 5 INJECTION INTRAVENOUS at 09:09

## 2022-12-31 RX ADMIN — OSELTAMIVIR PHOSPHATE 75 MG: 75 CAPSULE ORAL at 21:07

## 2022-12-31 RX ADMIN — ARFORMOTEROL TARTRATE 15 MCG: 15 SOLUTION RESPIRATORY (INHALATION) at 20:37

## 2022-12-31 RX ADMIN — DOXYCYCLINE 100 MG: 100 INJECTION, POWDER, LYOPHILIZED, FOR SOLUTION INTRAVENOUS at 09:33

## 2022-12-31 RX ADMIN — IPRATROPIUM BROMIDE AND ALBUTEROL SULFATE 1 AMPULE: .5; 2.5 SOLUTION RESPIRATORY (INHALATION) at 16:34

## 2022-12-31 RX ADMIN — METHYLPREDNISOLONE SODIUM SUCCINATE 40 MG: 40 INJECTION, POWDER, FOR SOLUTION INTRAMUSCULAR; INTRAVENOUS at 13:25

## 2022-12-31 RX ADMIN — IPRATROPIUM BROMIDE AND ALBUTEROL SULFATE 1 AMPULE: .5; 2.5 SOLUTION RESPIRATORY (INHALATION) at 14:09

## 2022-12-31 RX ADMIN — ACETAMINOPHEN 650 MG: 325 TABLET, FILM COATED ORAL at 21:07

## 2022-12-31 RX ADMIN — SODIUM CHLORIDE, PRESERVATIVE FREE 10 ML: 5 INJECTION INTRAVENOUS at 09:15

## 2022-12-31 NOTE — PROGRESS NOTES
Spoke with patient about wearing bipap, patient stated \"he does not want to wear it\". Patient remains on 15L HFNC , no respiratory distress SpO2 95% at this time.

## 2022-12-31 NOTE — PROGRESS NOTES
[facility]  Internal Medicine Residency / [address]    Attending Physician Statement  I have discussed the case, including pertinent history and exam findings with the resident and the team.  I have seen and examined the patient and the key elements of the encounter have been performed by me. I agree with the assessment, plan and orders as documented by the resident. Case Discussed During AM Rounds   Covering for Dr. Cruzito Keller and House Team 2 this weekend    Patient seen at bedside with family present   Remains on high flow oxygen therapy   Clinically feels improved   He took off BiPAP and states he feels comfortable on high flow oxygen   Remains at risk for clinical decompensation and close monitoring is necessary with low threshold for transition to MICU        Acute Hypoxic Respiratory Failure with likely concerning for Influenza Pneumonia with potential secondary bacterial infection    Repeat CXR today    ABG to be completed   Oxygen ej protocol with transition back to BiPAP as appropriate given clinical concerns    Monitoring mentation closely    Continue Tamiflu   No wheezing on exam today- and plan to discontinue IV steroids  Continue antibiotics   Continue Breathing treatments     Pulmonary following and appreciate input     Abnormal CT of chest    Significant mediastinal adenopathy    Repeat CT to be completed after treatment per Pulmonary     Hx of EtOH use    Monitor for any signs of DTs   Currently no findings    Unintentional weight loss with worsening diarrhea   Further work-up will be needed as outpatient     Thrombocytopenia   Close monitoring needed     Remainder of medical problems as per resident note.       Jeremie Whitney MD, [address]   Internal Medicine Residency Faculty

## 2022-12-31 NOTE — PROGRESS NOTES
Pulmonary Progress Note  12/31/2022 1:04 PM  Subjective:   Admit Date: 12/28/2022  PCP: No primary care provider on file. Interval History: Feeling that breathing is easier. Coughing very little. Complains of dry nose. Diet: ADULT DIET; Regular; 4 carb choices (60 gm/meal)  SOB is: Moderate  Cough: Mild  Wheezing: None  chest pain: None    Data:   Scheduled Meds:    insulin glargine  16 Units SubCUTAneous Nightly    insulin lispro  0-8 Units SubCUTAneous TID WC    insulin lispro  0-4 Units SubCUTAneous Nightly    sodium chloride flush  5-40 mL IntraVENous 2 times per day    methylPREDNISolone  40 mg IntraVENous Q12H    Arformoterol Tartrate  15 mcg Nebulization BID    budesonide  250 mcg Nebulization BID    pantoprazole (PROTONIX) 40 mg injection  40 mg IntraVENous Daily    hydrOXYzine  25 mg IntraMUSCular Once    oseltamivir  75 mg Oral BID    sodium chloride flush  5-40 mL IntraVENous 2 times per day    enoxaparin  40 mg SubCUTAneous Daily    nicotine  1 patch TransDERmal Daily    cefTRIAXone (ROCEPHIN) IV  1,000 mg IntraVENous Q24H    doxycycline (VIBRAMYCIN) IV  100 mg IntraVENous Q12H    ipratropium-albuterol  1 ampule Inhalation Q4H WA    thiamine  100 mg Oral Daily    folic acid  1 mg Oral Daily       Continuous Infusions:    sodium chloride      dextrose      sodium chloride         PRN Meds: sodium chloride flush, sodium chloride, glucose, dextrose bolus **OR** dextrose bolus, glucagon (rDNA), dextrose, melatonin, sodium chloride flush, sodium chloride, polyethylene glycol, acetaminophen **OR** acetaminophen, ondansetron **OR** ondansetron    No intake/output data recorded. No intake/output data recorded.     No intake or output data in the 24 hours ending 12/31/22 1304    Patient Vitals for the past 96 hrs (Last 3 readings):   Weight   12/28/22 1511 180 lb (81.6 kg)         Recent Labs     12/29/22  0522 12/30/22  0444 12/31/22  0719   WBC 4.7 8.3 7.4   HGB 12.1* 11.6* 11.5*   HCT 35.3* 32.5* 33.7* MCV 91.0 87.1 91.1   PLT 74* 116* 130     Recent Labs     22  2033 22  0444 22  0719    140 137   K 4.3 4.2 4.4    104 101   CO2 23 24 26   BUN 19 17 17   CREATININE 0.6* 0.6* 0.6*     Recent Labs     22  1536 22  0522 22  0444 22  0719   PROT 6.6 6.6 6.0* 6.1*   ALKPHOS 44 43 52 55   ALT 16 16 18 34   AST 55* 51* 37 50*   BILITOT 0.4 0.3 0.3 0.5   LIPASE 24  --   --   --      Recent Labs     22  1536   INR 1.2       INR:   Recent Labs     22  1536   INR 1.2         -----------------------------------------------------------------  RAD:   Results for orders placed during the hospital encounter of 22    XR CHEST PORTABLE    Narrative  EXAMINATION:  ONE XRAY VIEW OF THE CHEST    2022 3:52 pm    COMPARISON:  None. HISTORY:  ORDERING SYSTEM PROVIDED HISTORY: hypoxic  TECHNOLOGIST PROVIDED HISTORY:  Reason for exam:->hypoxic  What reading provider will be dictating this exam?->CRC    FINDINGS:  Bilateral lung opacities right greater than left. There is no effusion or  pneumothorax. The cardiomediastinal silhouette is without acute process. The  osseous structures are without acute process. Impression  Bilateral lung opacities right greater than left.       Micro:  Recent Labs     22  1536   BC 24 Hours no growth       Recent Labs     22  1536   BLOODCULT2 24 Hours no growth       Recent Labs     22  2315   LABURIN Growth not present          Additional Respiratory Assessments  Heart Rate: 67  Resp: 27  SpO2: 94 %    Objective:   Vitals:   Vitals:    22 1014   BP:    Pulse:    Resp:    Temp:    SpO2: 94%      TEMP:Current: Temp: (!) 96.4 °F (35.8 °C)  Max: Temp  Av.9 °F (36.1 °C)  Min: 96.4 °F (35.8 °C)  Max: 97.4 °F (36.3 °C)    BP Range: Systolic (72HZD), TXR:417 , Min:120 , BTD:122     Diastolic (26GGW), KZI:20, Min:69, Max:78      General appearance: alert, appears stated age and cooperative  In no acute distress thin built  Skin: No rashes or lesions  HEENT: mucous membranes are moist  Neck: No JVD  Lungs: symmetrical expansion, Rales bilaterally. 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 With extensive groundglass changes  Legionella strep pneumonia viral panel otherwise negative  12/29 Pro-Tobin elevated  12/30 on 15 L  12/31 Tmin 96.4 on 15 L saturating 94%    Acute hypoxic respiratory failure  Influenza A positive on Tamiflu  COPD exacerbation  Superimposed community-acquired pneumonia  Mediastinal adenopathy  Nicotine addiction  Lactic acidosis on admission  Thrombocytopenia history of EtOH  Elevated LFTs  DM with elevated blood sugars due to history  HLD  HTN  DVT prophylaxis with Lovenox       Plan: On ceftriaxone and doxycycline  Saline nasal spray  On Tamiflu  Check alpha-1 antitrypsin  Will need repeat CT after treatment to follow-up with the mediastinal adenopathy  Adjust insulin  BS monitor and Brovana  On IV steroids patient is not wheezing can DC.   This is detrimental for influenza pneumonia    Stephanie Salguero MD,FCCP

## 2023-01-01 LAB
ALBUMIN SERPL-MCNC: 3 G/DL (ref 3.5–5.2)
ALBUMIN SERPL-MCNC: 3 G/DL (ref 3.5–5.2)
ALP BLD-CCNC: 52 U/L (ref 40–129)
ALP BLD-CCNC: 52 U/L (ref 40–129)
ALT SERPL-CCNC: 35 U/L (ref 0–40)
ALT SERPL-CCNC: 35 U/L (ref 0–40)
ANION GAP SERPL CALCULATED.3IONS-SCNC: 10 MMOL/L (ref 7–16)
ANION GAP SERPL CALCULATED.3IONS-SCNC: 10 MMOL/L (ref 7–16)
AST SERPL-CCNC: 34 U/L (ref 0–39)
AST SERPL-CCNC: 34 U/L (ref 0–39)
BASOPHILS ABSOLUTE: 0 E9/L (ref 0–0.2)
BASOPHILS ABSOLUTE: 0 E9/L (ref 0–0.2)
BASOPHILS RELATIVE PERCENT: 0.1 % (ref 0–2)
BASOPHILS RELATIVE PERCENT: 0.1 % (ref 0–2)
BILIRUB SERPL-MCNC: 0.6 MG/DL (ref 0–1.2)
BILIRUB SERPL-MCNC: 0.6 MG/DL (ref 0–1.2)
BUN BLDV-MCNC: 17 MG/DL (ref 6–20)
BUN BLDV-MCNC: 17 MG/DL (ref 6–20)
CALCIUM SERPL-MCNC: 8.1 MG/DL (ref 8.6–10.2)
CALCIUM SERPL-MCNC: 8.1 MG/DL (ref 8.6–10.2)
CHLORIDE BLD-SCNC: 102 MMOL/L (ref 98–107)
CHLORIDE BLD-SCNC: 102 MMOL/L (ref 98–107)
CO2: 27 MMOL/L (ref 22–29)
CO2: 27 MMOL/L (ref 22–29)
CREAT SERPL-MCNC: 0.6 MG/DL (ref 0.7–1.2)
CREAT SERPL-MCNC: 0.6 MG/DL (ref 0.7–1.2)
EOSINOPHILS ABSOLUTE: 0.14 E9/L (ref 0.05–0.5)
EOSINOPHILS ABSOLUTE: 0.14 E9/L (ref 0.05–0.5)
EOSINOPHILS RELATIVE PERCENT: 1.7 % (ref 0–6)
EOSINOPHILS RELATIVE PERCENT: 1.7 % (ref 0–6)
GFR SERPL CREATININE-BSD FRML MDRD: >60 ML/MIN/1.73
GFR SERPL CREATININE-BSD FRML MDRD: >60 ML/MIN/1.73
GLUCOSE BLD-MCNC: 133 MG/DL (ref 74–99)
GLUCOSE BLD-MCNC: 133 MG/DL (ref 74–99)
HCT VFR BLD CALC: 33.3 % (ref 37–54)
HCT VFR BLD CALC: 33.3 % (ref 37–54)
HEMOGLOBIN: 11.6 G/DL (ref 12.5–16.5)
HEMOGLOBIN: 11.6 G/DL (ref 12.5–16.5)
LYMPHOCYTES ABSOLUTE: 2.04 E9/L (ref 1.5–4)
LYMPHOCYTES ABSOLUTE: 2.04 E9/L (ref 1.5–4)
LYMPHOCYTES RELATIVE PERCENT: 24.4 % (ref 20–42)
LYMPHOCYTES RELATIVE PERCENT: 24.4 % (ref 20–42)
MCH RBC QN AUTO: 31.4 PG (ref 26–35)
MCH RBC QN AUTO: 31.4 PG (ref 26–35)
MCHC RBC AUTO-ENTMCNC: 34.8 % (ref 32–34.5)
MCHC RBC AUTO-ENTMCNC: 34.8 % (ref 32–34.5)
MCV RBC AUTO: 90.2 FL (ref 80–99.9)
MCV RBC AUTO: 90.2 FL (ref 80–99.9)
METAMYELOCYTES RELATIVE PERCENT: 0.9 % (ref 0–1)
METAMYELOCYTES RELATIVE PERCENT: 0.9 % (ref 0–1)
METER GLUCOSE: 157 MG/DL (ref 74–99)
METER GLUCOSE: 157 MG/DL (ref 74–99)
METER GLUCOSE: 188 MG/DL (ref 74–99)
METER GLUCOSE: 188 MG/DL (ref 74–99)
METER GLUCOSE: 245 MG/DL (ref 74–99)
METER GLUCOSE: 245 MG/DL (ref 74–99)
METER GLUCOSE: 306 MG/DL (ref 74–99)
METER GLUCOSE: 306 MG/DL (ref 74–99)
MONOCYTES ABSOLUTE: 0.34 E9/L (ref 0.1–0.95)
MONOCYTES ABSOLUTE: 0.34 E9/L (ref 0.1–0.95)
MONOCYTES RELATIVE PERCENT: 4.3 % (ref 2–12)
MONOCYTES RELATIVE PERCENT: 4.3 % (ref 2–12)
NEUTROPHILS ABSOLUTE: 5.95 E9/L (ref 1.8–7.3)
NEUTROPHILS ABSOLUTE: 5.95 E9/L (ref 1.8–7.3)
NEUTROPHILS RELATIVE PERCENT: 68.7 % (ref 43–80)
NEUTROPHILS RELATIVE PERCENT: 68.7 % (ref 43–80)
PDW BLD-RTO: 11.9 FL (ref 11.5–15)
PDW BLD-RTO: 11.9 FL (ref 11.5–15)
PLATELET # BLD: 132 E9/L (ref 130–450)
PLATELET # BLD: 132 E9/L (ref 130–450)
PMV BLD AUTO: 11.4 FL (ref 7–12)
PMV BLD AUTO: 11.4 FL (ref 7–12)
POLYCHROMASIA: ABNORMAL
POLYCHROMASIA: ABNORMAL
POTASSIUM SERPL-SCNC: 3.6 MMOL/L (ref 3.5–5)
POTASSIUM SERPL-SCNC: 3.6 MMOL/L (ref 3.5–5)
RBC # BLD: 3.69 E12/L (ref 3.8–5.8)
RBC # BLD: 3.69 E12/L (ref 3.8–5.8)
SODIUM BLD-SCNC: 139 MMOL/L (ref 132–146)
SODIUM BLD-SCNC: 139 MMOL/L (ref 132–146)
TOTAL PROTEIN: 5.9 G/DL (ref 6.4–8.3)
TOTAL PROTEIN: 5.9 G/DL (ref 6.4–8.3)
WBC # BLD: 8.5 E9/L (ref 4.5–11.5)
WBC # BLD: 8.5 E9/L (ref 4.5–11.5)

## 2023-01-01 PROCEDURE — 94660 CPAP INITIATION&MGMT: CPT

## 2023-01-01 PROCEDURE — 97535 SELF CARE MNGMENT TRAINING: CPT

## 2023-01-01 PROCEDURE — 36415 COLL VENOUS BLD VENIPUNCTURE: CPT

## 2023-01-01 PROCEDURE — 97161 PT EVAL LOW COMPLEX 20 MIN: CPT

## 2023-01-01 PROCEDURE — 85025 COMPLETE CBC W/AUTO DIFF WBC: CPT

## 2023-01-01 PROCEDURE — 2580000003 HC RX 258: Performed by: INTERNAL MEDICINE

## 2023-01-01 PROCEDURE — 82962 GLUCOSE BLOOD TEST: CPT

## 2023-01-01 PROCEDURE — 6370000000 HC RX 637 (ALT 250 FOR IP)

## 2023-01-01 PROCEDURE — 2700000000 HC OXYGEN THERAPY PER DAY

## 2023-01-01 PROCEDURE — 6360000002 HC RX W HCPCS: Performed by: INTERNAL MEDICINE

## 2023-01-01 PROCEDURE — 2580000003 HC RX 258

## 2023-01-01 PROCEDURE — S5553 INSULIN LONG ACTING 5 U: HCPCS

## 2023-01-01 PROCEDURE — 99232 SBSQ HOSP IP/OBS MODERATE 35: CPT | Performed by: INTERNAL MEDICINE

## 2023-01-01 PROCEDURE — 80053 COMPREHEN METABOLIC PANEL: CPT

## 2023-01-01 PROCEDURE — 94640 AIRWAY INHALATION TREATMENT: CPT

## 2023-01-01 PROCEDURE — 2060000000 HC ICU INTERMEDIATE R&B

## 2023-01-01 PROCEDURE — 2500000003 HC RX 250 WO HCPCS

## 2023-01-01 PROCEDURE — 97530 THERAPEUTIC ACTIVITIES: CPT

## 2023-01-01 PROCEDURE — 6370000000 HC RX 637 (ALT 250 FOR IP): Performed by: INTERNAL MEDICINE

## 2023-01-01 PROCEDURE — 97165 OT EVAL LOW COMPLEX 30 MIN: CPT

## 2023-01-01 RX ORDER — POTASSIUM CHLORIDE 20 MEQ/1
20 TABLET, EXTENDED RELEASE ORAL ONCE
Status: COMPLETED | OUTPATIENT
Start: 2023-01-01 | End: 2023-01-01

## 2023-01-01 RX ORDER — INSULIN GLARGINE-YFGN 100 [IU]/ML
10 INJECTION, SOLUTION SUBCUTANEOUS NIGHTLY
Status: DISCONTINUED | OUTPATIENT
Start: 2023-01-01 | End: 2023-01-05 | Stop reason: HOSPADM

## 2023-01-01 RX ORDER — PANTOPRAZOLE SODIUM 40 MG/1
40 TABLET, DELAYED RELEASE ORAL
Status: COMPLETED | OUTPATIENT
Start: 2023-01-01 | End: 2023-01-05

## 2023-01-01 RX ADMIN — OSELTAMIVIR PHOSPHATE 75 MG: 75 CAPSULE ORAL at 09:28

## 2023-01-01 RX ADMIN — ARFORMOTEROL TARTRATE 15 MCG: 15 SOLUTION RESPIRATORY (INHALATION) at 20:07

## 2023-01-01 RX ADMIN — SODIUM CHLORIDE, PRESERVATIVE FREE 10 ML: 5 INJECTION INTRAVENOUS at 09:24

## 2023-01-01 RX ADMIN — IPRATROPIUM BROMIDE AND ALBUTEROL SULFATE 1 AMPULE: .5; 2.5 SOLUTION RESPIRATORY (INHALATION) at 17:13

## 2023-01-01 RX ADMIN — ARFORMOTEROL TARTRATE 15 MCG: 15 SOLUTION RESPIRATORY (INHALATION) at 08:44

## 2023-01-01 RX ADMIN — Medication 1 MG: at 09:28

## 2023-01-01 RX ADMIN — BUDESONIDE 250 MCG: 0.25 INHALANT ORAL at 08:44

## 2023-01-01 RX ADMIN — MELATONIN 3 MG ORAL TABLET 3 MG: 3 TABLET ORAL at 20:19

## 2023-01-01 RX ADMIN — ACETAMINOPHEN 650 MG: 325 TABLET, FILM COATED ORAL at 20:19

## 2023-01-01 RX ADMIN — ACETAMINOPHEN 650 MG: 325 TABLET, FILM COATED ORAL at 09:34

## 2023-01-01 RX ADMIN — IPRATROPIUM BROMIDE AND ALBUTEROL SULFATE 1 AMPULE: .5; 2.5 SOLUTION RESPIRATORY (INHALATION) at 12:36

## 2023-01-01 RX ADMIN — INSULIN LISPRO 6 UNITS: 100 INJECTION, SOLUTION INTRAVENOUS; SUBCUTANEOUS at 13:38

## 2023-01-01 RX ADMIN — Medication 100 MG: at 09:28

## 2023-01-01 RX ADMIN — POTASSIUM CHLORIDE 20 MEQ: 1500 TABLET, EXTENDED RELEASE ORAL at 09:28

## 2023-01-01 RX ADMIN — DOXYCYCLINE 100 MG: 100 INJECTION, POWDER, LYOPHILIZED, FOR SOLUTION INTRAVENOUS at 21:10

## 2023-01-01 RX ADMIN — BUDESONIDE 250 MCG: 0.25 INHALANT ORAL at 20:07

## 2023-01-01 RX ADMIN — IPRATROPIUM BROMIDE AND ALBUTEROL SULFATE 1 AMPULE: .5; 2.5 SOLUTION RESPIRATORY (INHALATION) at 08:44

## 2023-01-01 RX ADMIN — IPRATROPIUM BROMIDE AND ALBUTEROL SULFATE 1 AMPULE: .5; 2.5 SOLUTION RESPIRATORY (INHALATION) at 20:07

## 2023-01-01 RX ADMIN — INSULIN GLARGINE-YFGN 10 UNITS: 100 INJECTION, SOLUTION SUBCUTANEOUS at 21:10

## 2023-01-01 RX ADMIN — PANTOPRAZOLE SODIUM 40 MG: 40 TABLET, DELAYED RELEASE ORAL at 10:32

## 2023-01-01 RX ADMIN — OSELTAMIVIR PHOSPHATE 75 MG: 75 CAPSULE ORAL at 20:19

## 2023-01-01 RX ADMIN — DOXYCYCLINE 100 MG: 100 INJECTION, POWDER, LYOPHILIZED, FOR SOLUTION INTRAVENOUS at 09:28

## 2023-01-01 RX ADMIN — SODIUM CHLORIDE, PRESERVATIVE FREE 10 ML: 5 INJECTION INTRAVENOUS at 21:10

## 2023-01-01 ASSESSMENT — PAIN SCALES - GENERAL: PAINLEVEL_OUTOF10: 4

## 2023-01-01 NOTE — PROGRESS NOTES
[facility]  Internal Medicine Residency Program  Progress Note - House Team     Patient:  Alyssa Angel 47 y.o. male MRN: [id number]     Date of Service: 12/31/2022     CC: SOB  Overnight events: Increased high-dose sliding scale, refused BiPAP last night saturating 95% on 15 high flow nasal cannula    Subjective     Patient was seen and examined this morning at bedside in no acute distress. Patient has no complaints and is breathing on 15 high flow nasal cannula requests PT OT to evaluate him he would like to move around more. Of note when talking with family he has had 50 pounds weight loss unintentionally with chronic diarrhea. Objective     Physical Exam:  Vitals: /76   Pulse 75   Temp 98.2 °F (36.8 °C) (Temporal)   Resp 18   Ht 6' 1\" (1.854 m)   Wt 180 lb (81.6 kg)   SpO2 96%   BMI 23.75 kg/m²     I & O - 24hr: No intake/output data recorded. Net Balance:       General Appearance: alert, appears stated age, and cooperative  HEENT:  Head: Normocephalic, no lesions, without obvious abnormality. Neck: no JVD  Lung: Crackles on lower left side  Heart: regular rate and rhythm, S1, S2 normal, no murmur, click, rub or gallop  Abdomen: soft, non-tender; bowel sounds normal; no masses,  no organomegaly  Extremities:  extremities normal, atraumatic, no cyanosis or edema  Musculokeletal: No joint swelling, no muscle tenderness. ROM normal in all joints of extremities.    Neurologic: Mental status: Alert, oriented, thought content appropriate  Other:       Pertinent Labs & Imaging Studies     Basic Labs:    Complete Blood Count:   Recent Labs     12/29/22 0522 12/30/22  0444 12/31/22  0719   WBC 4.7 8.3 7.4   HGB 12.1* 11.6* 11.5*   HCT 35.3* 32.5* 33.7*   PLT 74* 116* 130          Last 3 Blood Glucose:   Recent Labs     12/29/22 2033 12/30/22  0444 12/31/22  0719   GLUCOSE 353* 301* 268*          PT/INR:    Lab Results   Component Value Date/Time    PROTIME 13.1 12/28/2022 03:36 PM    INR 1.2 12/28/2022 03:36 PM     PTT:  No results found for: APTT, PTT    Comprehensive Metabolic Profile:   Recent Labs     12/29/22 2033 12/30/22  0444 12/31/22  0719    140 137   K 4.3 4.2 4.4    104 101   CO2 23 24 26   BUN 19 17 17   CREATININE 0.6* 0.6* 0.6*   GLUCOSE 353* 301* 268*   CALCIUM 8.2* 8.0* 8.5*   PROT  --  6.0* 6.1*   LABALBU  --  2.9* 3.1*   BILITOT  --  0.3 0.5   ALKPHOS  --  52 55   AST  --  37 50*   ALT  --  18 34        Magnesium: No results found for: MG  Phosphorus: No results found for: PHOS  Ionized Calcium: No results found for: CAION   Troponin: No results for input(s): TROPONINI in the last 72 hours. Imaging Studies:        Resident's Assessment and Plan     SUMMARY OF HOSPITAL STAY:       Consults:   Pulmonology    Assessment and Plan:    Acute hypoxic respiratory failure 2/2 influenza A w/ superimposed CAP in the setting of likely acute COPD exacerbation  CTA showed severe b/l diffuse groundglass opacities  Breathing tx Brovana, pulmicort, duoneb, incentive spirometer    Resp panel, legionella neg,   Ceftriaxone and doxycycline for CAP coverage with methylprednisolone 40 twice daily  Follow respiratory cultures, blood cultures 12/28 NGTD, urine cultures, MRSA nares  Continue BiPAP at night and sleeping, Vistaril 25 as needed if anxious on BiPAP  Appreciate pulmonary rec, will follow with pulm outpatient  Influenza A, pos since 12/24  Tamiflu 12/28-1/2  ?  ARDS  Follow ABG and CXR   Follow PF ratio   Hx of HTN - previously on lisinopril, not taking  Hx of HLD - not taking home statins   Transaminitis likely 2/2 alcohol use, resolved - alcohol pattern  HAGMA 2/2 lactic acidosis, resolved cont thiamine 297 qd & folic acid 1mg qd  NIDDM - previously on metformin and glipizide, has not taken meds in a yr  AC/HS w/ 16 glargine, MDSS  Blood glucose rising likely due to steroids  Thrombocytopenia likely 2/2 sepsi, PBS wnl   Hx of tobacco abuse - smokes 1-1.5ppd since he was young, nicotine patch as needed  Hx of alcohol  abuse - drinks 2-3 beers per days for the last 30 yrs  Not currently on CIWA protocol, thiamine and folic acid  Monitor for signs of withdrawal    PT/OT evaluation: Not indicated at this time  DVT prophylaxis: Lovenox 40  GI prophylaxis: Protonix 40  Bowel regimen: As needed, has had bowel movement every day  Pain management: As needed  Disposition: continue current care /will likely need HHC, O2, and IV on discharge, will follow with pulmonology outpatient and get PFTs for likely COPD    Francisco Bullard MD, PGY-1  Attending physician: Dr. Estella Duran

## 2023-01-01 NOTE — PROGRESS NOTES
Occupational Therapy  OCCUPATIONAL THERAPY INITIAL EVALUATION    RUBINA [facility] [employer] [address] [address]      Date:2023                                                Patient Name: Ksenia Redd  MRN: [id number]  : 1970  Room: [place], [place] #5971    Referring Provider: Lay Mary MD  Specific Provider Orders/Date: OT eval and treat 22    Diagnosis: Acute respiratory distress [R06.03]  Hypoxia [R09.02]  Influenza with respiratory manifestation other than pneumonia [J11.1]  COPD exacerbation ([address] [J44.1]  COPD with acute exacerbation ([address] [J44.1]  Community acquired pneumonia, unspecified laterality [J18.9]   Pt admitted to hospital on 22 for SOB, respiratory distress    Pertinent Medical History:  has a past medical history of COPD (chronic obstructive pulmonary disease) ([facility] Diabetes mellitus ([facility] Hyperlipidemia, and Hypertension.        Precautions:  Fall Risk, 15L HFNC, continuous pulse ox, droplet isolation (influenza A)    Assessment of current deficits    [x] Functional mobility  [x]ADLs  [x] Strength               []Cognition    [x] Functional transfers   [x] IADLs         [x] Safety Awareness   [x]Endurance    [] Fine Coordination              [x] Balance      [] Vision/perception   []Sensation     []Gross Motor Coordination  [] ROM  [] Delirium                   [] Motor Control     OT PLAN OF CARE   OT POC based on physician orders, patient diagnosis and results of clinical assessment    Frequency/Duration 1-3 days/wk for 2 weeks PRN   Specific OT Treatment Interventions to include:   * Instruction/training on adapted ADL techniques and AE recommendations to increase functional independence within precautions       * Training on energy conservation strategies, correct breathing pattern and techniques to improve independence/tolerance for self-care routine  * Functional transfer/mobility training/DME recommendations for increased independence, safety, and fall prevention  * Patient/Family education to increase follow through with safety techniques and functional independence  * Recommendation of environmental modifications for increased safety with functional transfers/mobility and ADLs  * Therapeutic activities to facilitate/challenge dynamic balance, stand tolerance for increased safety and independence with ADLs  * Therapeutic activities to facilitate gross/fine motor skills for increased independence with ADLs      Recommended Adaptive Equipment: TBD     Home Living: Pt lives alone in 1 floor home. [address]   Bathroom setup: walk in shower    Equipment owned: cane, w/w, w/c    Prior Level of Function: independent with ADLs , independent with IADLs; ambulated independently w/o AD   Driving: yes   Occupation: manager of [employer]    Pain Level: Pt c/o no pain this session    Cognition: A&O: 4/4; Follows 1-2 step directions   Memory:  good    Sequencing:  good    Problem solving:  fair +   Judgement/safety:  fair +     Functional Assessment:  AM-PAC Daily Activity Raw Score: 19/24   Initial Eval Status  Date: 1/1/23 Treatment Status  Date: STGs = LTGs  Time frame: 10-14 days   Feeding Independent   -   Grooming Stand by Assist   Standing at sink  Modified [place]    UB Dressing Supervision   Modified [place]    LB Dressing Stand by Assist   Modified [place]    Bathing Stand by Assist  Modified [place]    Toileting Stand by Assist   Modified [place]    Bed Mobility  Supine to sit:  Independent   Sit to supine: Independent   -   Functional Transfers Supervision   Independent    Functional Mobility Supervision w/o AD  In room and bathroom  Independent    Balance Sitting:     Static:  Independent    Dynamic:Independent  Standing: Supervision     Activity Tolerance Fair  See vitals below  Good   Visual/  Perceptual Glasses: yes                  Hand Dominance R   AROM (PROM) Strength Additional Info:    RUE  WFL 5/5 good  and wfl FMC/dexterity noted during ADL tasks       LUE WFL 5/5 good  and wfl FMC/dexterity noted during ADL tasks       Hearing: WFL   Sensation:  No c/o numbness or tingling   Tone: WFL   Edema: none noted    Comments: Upon arrival patient lying in bed. Therapist educated pt on role of OT. RN clearance. At end of session, patient long sitting in bed (HOB elevated) with call light and phone within reach, all lines and tubes intact. Overall patient demonstrated decreased independence and safety during completion of ADL/functional transfer/mobility tasks. Pt would benefit from continued skilled OT to increase safety and independence with completion of ADL/IADL tasks for functional independence and quality of life. Treatment: OT treatment provided this date includes: Facilitation of bed mobility, unsupported sitting balance, functional transfers (various surfaces), standing tolerance tasks (addressing posture, balance and activity tolerance while incorporating light functional reaching impacting ADLs and functional activity) and functional ambulation tasks (including to/ from bathroom and in room w/ education/skilled cuing on energy conservation techniques and safety). Therapist facilitated self-care retraining: UB/LB self-care tasks, simulated toileting task and standing grooming tasks while educating pt on modified techniques, posture, safety and emphasized energy conservation techniques. Also educated pt on self-monitoring of O2 sat and SOB - pt verbalized good understanding. Skilled monitoring of HR, O2 sats and pts response to treatment. Vitals:   Pt on 15L HFNC  At rest: O2 sat=91-95%  Seated EOB: ^93%  Post 1st ambulation task (seated): O2 sat=85% w/ recovery to 94% after <1 minute.  Reinforced rest and pursed lip breathing  Post 2nd ambulation task ><bathroom (seated position): O2 sat=88% w/ recovery to 91% in <1 minute  End of session: O2 sat=96%  RN notified/aware of the above    Rehab Potential: Good for established goals     Patient / Family Goal: to return home and to improve functional endurance      Patient and/or family were instructed on functional diagnosis, prognosis/goals and OT plan of care. Demonstrated good understanding. Eval Complexity: Low    Time In: 08:05  Time Out: 08:30  Total Treatment Time: 10 minutes    Min Units   OT Eval Low 97165  X  1   OT Eval Medium 86823      OT Eval High 00596      OT Re-Eval [id number]       Therapeutic Ex 81551       Therapeutic Activities 68566       ADL/Self Care 71306  10  1   Orthotic Management 53312       Manual 19825     Neuro Re-Ed 01365       Non-Billable Time          Evaluation Time additionally includes thorough review of current medical information, gathering information on past medical history/social history and prior level of function, interpretation of standardized testing/informal observation of tasks, assessment of data and development of plan of care and goals.         CARLA MazaR/L #[id number]

## 2023-01-01 NOTE — PROGRESS NOTES
Pulmonary Progress Note  1/1/2023 2:20 PM  Subjective:   Admit Date: 12/28/2022  PCP: No primary care provider on file. Interval History: Feeling much better. Still on 15 L  Diet: ADULT DIET; Regular; 4 carb choices (60 gm/meal)  SOB is: Mild  Cough: None  Wheezing: None  chest pain: None    Data:   Scheduled Meds:    pantoprazole  40 mg Oral QAM AC    insulin lispro  0-8 Units SubCUTAneous TID WC    insulin lispro  0-4 Units SubCUTAneous Nightly    insulin glargine  20 Units SubCUTAneous Nightly    sodium chloride flush  5-40 mL IntraVENous 2 times per day    Arformoterol Tartrate  15 mcg Nebulization BID    budesonide  250 mcg Nebulization BID    hydrOXYzine  25 mg IntraMUSCular Once    oseltamivir  75 mg Oral BID    sodium chloride flush  5-40 mL IntraVENous 2 times per day    enoxaparin  40 mg SubCUTAneous Daily    nicotine  1 patch TransDERmal Daily    cefTRIAXone (ROCEPHIN) IV  1,000 mg IntraVENous Q24H    doxycycline (VIBRAMYCIN) IV  100 mg IntraVENous Q12H    ipratropium-albuterol  1 ampule Inhalation Q4H WA    thiamine  100 mg Oral Daily    folic acid  1 mg Oral Daily       Continuous Infusions:    sodium chloride      dextrose      sodium chloride         PRN Meds: sodium chloride, sodium chloride flush, sodium chloride, glucose, dextrose bolus **OR** dextrose bolus, glucagon (rDNA), dextrose, melatonin, sodium chloride flush, sodium chloride, polyethylene glycol, acetaminophen **OR** acetaminophen, ondansetron **OR** ondansetron    No intake/output data recorded. No intake/output data recorded.     No intake or output data in the 24 hours ending 01/01/23 1420    Patient Vitals for the past 96 hrs (Last 3 readings):   Weight   12/28/22 1511 180 lb (81.6 kg)         Recent Labs     12/30/22  0444 12/31/22  0719 01/01/23  0553   WBC 8.3 7.4 8.5   HGB 11.6* 11.5* 11.6*   HCT 32.5* 33.7* 33.3*   MCV 87.1 91.1 90.2   * 130 132     Recent Labs     12/30/22  0444 12/31/22  0719 01/01/23  0553    137 139   K 4.2 4.4 3.6    101 102   CO2 24 26 27   BUN 17 17 17   CREATININE 0.6* 0.6* 0.6*     Recent Labs     22  0444 22  0719 23  0553   PROT 6.0* 6.1* 5.9*   ALKPHOS 52 55 52   ALT 18 34 35   AST 37 50* 34   BILITOT 0.3 0.5 0.6           -----------------------------------------------------------------  RAD:   Results for orders placed during the hospital encounter of 22    XR CHEST PORTABLE    Narrative  EXAMINATION:  ONE XRAY VIEW OF THE CHEST    2022 2:17 pm    COMPARISON:  Chest series from 2022    HISTORY:  ORDERING SYSTEM PROVIDED HISTORY: SOB  TECHNOLOGIST PROVIDED HISTORY:  Reason for exam:->SOB  What reading provider will be dictating this exam?->CRC    FINDINGS:  Background lung hyperinflation and coarse interstitial markings. Persistent  interstitial opacities bilaterally perhaps slightly improved from previous  exam.  No pleural effusion or pneumothorax. Atherosclerotic disease in the  aortic arch. The remaining cardiomediastinal silhouette appears  unremarkable. Osseous and thoracic soft tissue structures demonstrate no  acute findings. Impression  Persistent but perhaps slightly decreased bilateral interstitial opacities. Lung hyperinflation and coarse interstitial markings. Atherosclerotic  disease. Impression  Bilateral lung opacities right greater than left. CT of chest                  chest film                 chest film                Micro:  Micro:       Additional Respiratory Assessments  Heart Rate: 72  Resp: 18  SpO2: 93 %    Objective:   Vitals:   Vitals:    23 0750   BP: 125/74   Pulse: 72   Resp: 18   Temp: 97.7 °F (36.5 °C)   SpO2: 93%      TEMP:Current: Temp: 97.7 °F (36.5 °C)  Max: Temp  Av °F (36.7 °C)  Min: 97.7 °F (36.5 °C)  Max: 98.2 °F (36.8 °C)    BP Range: Systolic (80YYA), GWU:597 , Min:125 , PZW:805     Diastolic (51ANP), HNN:85, Min:74, Max:76  General appearance: alert, appears stated age and cooperative  In no acute distress thin built  Skin: No rashes or lesions  HEENT: mucous membranes are moist  Neck: No JVD  Lungs: symmetrical expansion, Rales bilaterally. 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 With extensive groundglass changes  Legionella strep pneumonia viral panel otherwise negative  HIV negative  12/29 Pro-Tobin elevated  12/30 on 15 L  12/31 Tmin 96.4 on 15 L saturating 94% 7.49/31/63/24/93%    1/1/23 on 15 liters saturations 93%   Chest film decreased infiltrates     Acute hypoxic respiratory failure  Influenza A positive on Tamiflu  COPD   Superimposed community-acquired pneumonia on doxycycline ,and ceftriaxone   Mediastinal adenopathy  Nicotine addiction  Lactic acidosis on admission  Thrombocytopenia history of EtOH  Elevated LFTs  DM with elevated blood sugars due to history  HLD  HTN  DVT prophylaxis with Lovenox       Plan: On ceftriaxone and doxycycline  Replace Kcl  Still with elevated blood sugars   Chest x-ray clearing the patient saturations are still 96% on the 15 L.   Saline nasal spray to bedside  On Tamiflu  Check alpha-1 antitrypsin  Will need repeat CT after treatment to follow-up with the mediastinal adenopathy  Adjust insulin further still elevated blood sugars despite not being on oxygen  BS monitor and Mini Singletary MD,[facility]

## 2023-01-01 NOTE — PROGRESS NOTES
Physical Therapy  Physical Therapy Initial Assessment     Name: Shahram Bourgeois  : 1970  MRN: [id number]      Date of Service: 2023    Evaluating PT:  Kylah To PT, DPT [id number]    Room #:  5433/4330-D  Diagnosis:  Acute respiratory distress [R06.03]  Hypoxia [R09.02]  Influenza with respiratory manifestation other than pneumonia [J11.1]  COPD exacerbation ([facility] [J44.1]  COPD with acute exacerbation ([facility] [J44.1]  Community acquired pneumonia, unspecified laterality [J18.9]  PMHx/PSHx:    Past Medical History:   Diagnosis Date    COPD (chronic obstructive pulmonary disease) ([facility]     Diabetes mellitus ([facility]     Hyperlipidemia     Hypertension      Precautions:  Fall risk, Alarms, O2 15L, Droplet + Influenza, COPD  Equipment Needs:  TBD    SUBJECTIVE:  Pt lives alone in a one story home with 7 stairs to enter. Pt ambulated without AD PTA. OBJECTIVE:   Initial Evaluation  Date: 23 Treatment Short Term/ Long Term   Goals   AM-PAC 6 Clicks 99/55     Was pt agreeable to Eval/treatment? Yes     Does pt have pain? No     Bed Mobility  Rolling: NT  Supine to sit:  Independent  Sit to supine: Independent  Scooting: Independent   -   Transfers Sit to stand: Supervision  Stand to sit: Supervision  Stand pivot: Supervision no AD   Independent    Ambulation    50 feet with no AD Supervision  >250 feet with no AD Independent    Stair negotiation: ascended and descended  NT due to isolation  7 steps with unilateral rail Independent    ROM BUE:  AROM WFL  BLE:  AROM WFL     Strength BUE:  Refer to OT  BLE:  5  -   Balance Sitting EOB:  Independent  Dynamic Standing:  Supervision no AD  Sitting EOB:  -  Dynamic Standing:  Independent      Pt is A & O x 4  Sensation:  Pt denies numbness and tingling to extremities  Edema:  unremarkable  Vitals: SPO2 at rest 91-94%, with activity 92-95%, post activity 85% recovering to 94% in ~30 seconds   Therapeutic Exercises:  NA    Patient education  Pt educated on role and benefits of physical therapy, safety and technique for mobility    Patient response to education:   Pt verbalized understanding Pt demonstrated skill Pt requires further education in this area   x x x     ASSESSMENT:    Conditions Requiring Skilled Therapeutic Intervention:    []Decreased strength     []Decreased ROM  [x]Decreased functional mobility  []Decreased balance   [x]Decreased endurance   []Decreased posture  []Decreased sensation  []Decreased coordination   []Decreased vision  []Decreased safety awareness   []Increased pain       Comments:  Patient deemed medically stable prior to therapy evaluation. Patient was semi supine in bed upon therapy arrival and pleasantly provided consent to evaluation. Patient verbalized good insight to limitations this date, and was able to recall interpretation of his monitor to watch his saturation with activity. Repeated ambulatory trial to observe O2; improvements within activity with mild desaturation following as noted above. No feeling of SOB throughout. Patient left seated EOB with all needs met and call light in reach for safety. Treatment:  Patient practiced and was instructed in the following treatment:    Education- PLB to improve saturation, increase time with position change, monitoring of SPO2 during and following activity    Pt's/ family goals   1. Return to PLOF    Prognosis is good for reaching above PT goals. Patient and or family understand(s) diagnosis, prognosis, and plan of care.   Yes     PHYSICAL THERAPY PLAN OF CARE:    PT POC is established based on physician order and patient diagnosis     Referring provider/PT Order:    12/31/22 1145  PT eval and treat  Start:  12/31/22 1145,   End:  12/31/22 1145,   ONE TIME,   Standing Count:  1 Occurrences,         Diagnosis:  Acute respiratory distress [R06.03]  Hypoxia [R09.02]  Influenza with respiratory manifestation other than pneumonia [J11.1]  [facility] [J44.1]  COPD with acute exacerbation (H[facility]) [J44.1]  Community acquired pneumonia, unspecified laterality [J18.9]  Specific instructions for next treatment:  progress as able     Current Treatment Recommendations:     [] Strengthening to improve independence with functional mobility   [] ROM to improve independence with functional mobility   [] Balance Training to improve static/dynamic balance and to reduce fall risk  [x] Endurance Training to improve activity tolerance during functional mobility   [] Transfer Training to improve safety and independence with all functional transfers   [] Gait Training to improve gait mechanics, endurance and assess need for appropriate assistive device  [] Stair Training in preparation for safe discharge home and/or into the community   [] Positioning to prevent skin breakdown and contractures  [x] Safety and Education Training   [x] Patient/Caregiver Education   [] HEP  [] Other     PT long term treatment goals are located in above grid    Frequency of treatments: 2-5x/week x 1-2 weeks.    Time in  0805  Time out  0830    Total Treatment Time  10 minutes     Evaluation Time includes thorough review of current medical information, gathering information on past medical history/social history and prior level of function, completion of standardized testing/informal observation of tasks, assessment of data and education on plan of care and goals.    CPT codes:  [x] Low Complexity PT evaluation 40984  [] Moderate Complexity PT evaluation 68370  [] High Complexity PT evaluation 20049  [] PT Re-evaluation 13960  [] Gait training 96871 - minutes  [] Manual therapy 21859 - minutes  [x] Therapeutic activities 93117 10 minutes  [] Therapeutic exercises 98203 - minutes  [] Neuromuscular reeducation 80859 - minutes     Mckenzie Medina, PT, DPT [id number]

## 2023-01-01 NOTE — PROGRESS NOTES
[facility]  Internal Medicine Residency / [address]    Attending Physician Statement  I have discussed the case, including pertinent history and exam findings with the resident and the team.  I have seen and examined the patient and the key elements of the encounter have been performed by me. I agree with the assessment, plan and orders as documented by the resident. Case Discussed During AM Rounds   Covering for Dr. Alin Arceo and House Team 2 this weekend    No overnight events   Remains on High Flow Oxygen at this time    CXR with persistence of opacities    Patient continues to symptomatically feel improved    No wheezing on exam today    Glucose improved off steroids     Acute Hypoxic Respiratory Failure with likely concerning for Influenza Pneumonia with potential secondary bacterial infection    Repeat CXR and ABG reviewed- still poor oxygenation noted but patient symptomatically feels improved despite 15 L HF NC    Oxygen ej protocol with transition back to BiPAP if needed- will attempt to reduce oxygen needs today    Monitoring mentation closely- remains stable    Continue Tamiflu until stop date    Remain off IV steroids   Continue antibiotics for likely superimposed PNA   Continue Breathing treatments     Pulmonary following and appreciate input     Abnormal CT of chest    Significant mediastinal adenopathy    Repeat CT to be completed after treatment per Pulmonary     Hx of EtOH use    Monitor for any signs of DTs- currently remains without any concerns for withdrawal    Currently no findings    Unintentional weight loss with worsening diarrhea   Further work-up will be needed as outpatient     Thrombocytopenia- resolved     Remainder of medical problems as per resident note.       Elly Oropeza MD, [address]   Internal Medicine Residency Faculty

## 2023-01-01 NOTE — PROGRESS NOTES
[facility]  Internal Medicine Residency Program  Progress Note - House Team     Patient:  Ksenia Little 47 y.o. male MRN: [id number]     Date of Service: 1/1/2023     CC: Shortness of breath     Days since admission: 4    Subjective     Overnight events: BG elevated to 439, 8 units lispro (SSI) and 10 units regular insulin given. Increased to 20 u Lantus nightly. Patient seen and examined at bedside. He is in no acute distress. He remains on 15 L of oxygen this morning. He states he does not feel short of breath, he has no chest pain. He has no abdominal pain. He denies diarrhea, constipation, frequency, urgency, or dysuria. He is anxious to start decreasing his oxygen requirements so that he might be able to go home soon. Discussed smoking cessation with patient, states he plans to continue not smoking cigarettes on discharge. Objective     Physical Exam:  Vitals: /74   Pulse 72   Temp 97.7 °F (36.5 °C) (Oral)   Resp 18   Ht 6' 1\" (1.854 m)   Wt 180 lb (81.6 kg)   SpO2 93%   BMI 23.75 kg/m²     I & O - 24hr: No intake or output data in the 24 hours ending 01/01/23 0819   General Appearance: alert, appears stated age, and cooperative  HEENT:  Head: Normocephalic, no lesions, without obvious abnormality. Neck: supple, symmetrical, trachea midline  Lung: diminished breath sounds bibasilar and rhonchi bibasilar  Heart: regular rate and rhythm, S1, S2 normal, no murmur, click, rub or gallop  Abdomen: soft, non-tender; bowel sounds normal; no masses,  no organomegaly  Extremities:  extremities normal, atraumatic, no cyanosis or edema  Musculokeletal: No joint swelling, no muscle tenderness. ROM normal in all joints of extremities.    Neurologic: Mental status: Alert, oriented, thought content appropriate  Subject  Pertinent Information & Imaging Studies, Consults     CBC:   Lab Results   Component Value Date/Time    WBC 8.5 01/01/2023 05:53 AM    RBC 3.69 01/01/2023 05:53 AM    HGB 11.6 01/01/2023 05:53 AM    HCT 33.3 01/01/2023 05:53 AM    MCV 90.2 01/01/2023 05:53 AM    MCH 31.4 01/01/2023 05:53 AM    MCHC 34.8 01/01/2023 05:53 AM    RDW 11.9 01/01/2023 05:53 AM     01/01/2023 05:53 AM    MPV 11.4 01/01/2023 05:53 AM     CMP:    Lab Results   Component Value Date/Time     01/01/2023 05:53 AM    K 3.6 01/01/2023 05:53 AM    K 3.9 12/28/2022 03:36 PM     01/01/2023 05:53 AM    CO2 27 01/01/2023 05:53 AM    BUN 17 01/01/2023 05:53 AM    CREATININE 0.6 01/01/2023 05:53 AM    LABGLOM >60 01/01/2023 05:53 AM    GLUCOSE 133 01/01/2023 05:53 AM    PROT 5.9 01/01/2023 05:53 AM    LABALBU 3.0 01/01/2023 05:53 AM    CALCIUM 8.1 01/01/2023 05:53 AM    BILITOT 0.6 01/01/2023 05:53 AM    ALKPHOS 52 01/01/2023 05:53 AM    AST 34 01/01/2023 05:53 AM    ALT 35 01/01/2023 05:53 AM       IMAGING:   Imaging Studies:    XR CHEST PORTABLE    Result Date: 12/31/2022  Persistent but perhaps slightly decreased bilateral interstitial opacities. Lung hyperinflation and coarse interstitial markings. Atherosclerotic disease. XR CHEST PORTABLE    Result Date: 12/28/2022  Bilateral lung opacities right greater than left. CTA PULMONARY W CONTRAST    Result Date: 12/28/2022  1. Severe diffuse ground-glass opacities in both lungs with relative sparing of the bilateral lung apices. The distribution is fairly symmetric. There are scattered areas of interstitial prominence. (The imaging features are most suggestive of acute lung disease in that clinical setting. ARDS could have this appearance.) 2. Prominent mediastinal and hilar lymph nodes. 3.  No evidence of pulmonary embolism. No evidence of right heart strain. 4.  Atherosclerotic disease. 5.  Remainder of the study is as above. RECOMMENDATIONS: Recommend pulmonology consultation. Follow-up chest CT in 10-12 weeks also suggested.              Notable Cultures:      Blood cultures   Blood Culture, Routine   Date Value Ref Range Status   12/28/2022 24 Hours no growth  Preliminary     Respiratory cultures No results found for: RESPCULTURE No results found for: LABGRAM  Urine   Urine Culture, Routine   Date Value Ref Range Status   12/28/2022 Growth not present  Final     Legionella No results found for: LABLEGI  C Diff PCR No results found for: CDIFPCR  Wound culture/abscess: No results for input(s): WNDABS in the last 72 hours. Tip culture:No results for input(s): CXCATHTIP in the last 72 hours. Antibiotic  Days  Day started                                  OXYGENATION: 15 L at 96%    DIET: Regular adult        Resident's Assessment and Dawoodmary lou Echeverria is a 47 y.o. male with  has a past medical history of COPD (chronic obstructive pulmonary disease) ([facility] [address] Diabetes mellitus ([facility] [address] Hyperlipidemia, and Hypertension. came here with CC   Chief Complaint   Patient presents with    Shortness of Breath     Patient battling influenza since 12/23, was in respiratory distress at home today 35% RA.         Days since admission: 4    Consults:   Pulmonology    Assessment/Plan:  Pulmonary   Acute hypoxic respiratory failure due to influenza with superimposed CAP   CTA with severe bilateral diffuse groundglass opacities  Respiratory panel negative  Legionella negative  Follow respiratory cultures, blood cultures  Continue breathing treatments  Continue BiPAP at night  Vistaril as needed if anxious on BiPAP  Continue oxygen therapy  Continue ceftriaxone and doxycycline  Pulmonology following appreciate recs  Influenza A infection  Initially presented to [facility] and was positive on 12/24/2022  Continue Tamiflu until 1/2/2023  Repeat chest x-ray with no concern for ARDS at this time  Continue to monitor respiratory status  Cardio   Hx of HTN  Previously on lisinopril however patient is not taking at home  Continue to monitor blood pressure  Hx of HLD   Previously on statin but not taking currently  Lipid panel on 12/29/2022 showing elevated triglycerides at 193 otherwise WNL  Diet modifications encouraged  GI   Transaminitis in the setting of alcohol use - resolved  Patient drinks 2-3 beers daily last drink prior to admission on 12/28/2022  Encourage cessation  Renal   HAGMA due to lactic acidosis - resolved   Continue thiamine supplementation  Endocrine   NIDDM  Previously on metformin and glipizide, but has not taken medications in a year  Currently with uncontrolled blood glucose in the setting of steroid administration  Continue blood glucose checks AC/HS  Lantus increased from 16 to 20 units overnight, however last dose of steroids yesterday   Decrease Lantus to 10 units tonight  Continue MDSS  Psych   Hx of tobacco use  Smokes 1-1.5 ppd for total of 32 years, total of 48 pack years  Nicotine patch   CT on admission with significant groundglass opacities and mediastinal lymphadenopathy  Will need CT reassessment after resolution of influenza infection  Pulmonology following appreciate recs  Hx of alcohol abuse  Drinks 2-3 beers daily  Elevated transaminases on admission  Currently without any symptoms of withdrawal  Monitor for symptoms of withdrawal  Encourage cessation      PT/OT: Not indicated at this time  DVT ppx: Lovenox  GI ppx: Protonix      Next of Kin/ POA:  Peggy Corcoran    Code Status:   Full    Disposition: Continue current management      Kathe Donovan DO, PGY-1  Attending physician: Dr. Hammad Solomon

## 2023-01-02 LAB
ALBUMIN SERPL-MCNC: 3 G/DL (ref 3.5–5.2)
ALBUMIN SERPL-MCNC: 3 G/DL (ref 3.5–5.2)
ALP BLD-CCNC: 51 U/L (ref 40–129)
ALP BLD-CCNC: 51 U/L (ref 40–129)
ALT SERPL-CCNC: 31 U/L (ref 0–40)
ALT SERPL-CCNC: 31 U/L (ref 0–40)
ANION GAP SERPL CALCULATED.3IONS-SCNC: 11 MMOL/L (ref 7–16)
ANION GAP SERPL CALCULATED.3IONS-SCNC: 11 MMOL/L (ref 7–16)
AST SERPL-CCNC: 22 U/L (ref 0–39)
AST SERPL-CCNC: 22 U/L (ref 0–39)
BASOPHILS ABSOLUTE: 0.01 E9/L (ref 0–0.2)
BASOPHILS ABSOLUTE: 0.01 E9/L (ref 0–0.2)
BASOPHILS RELATIVE PERCENT: 0.1 % (ref 0–2)
BASOPHILS RELATIVE PERCENT: 0.1 % (ref 0–2)
BILIRUB SERPL-MCNC: 0.9 MG/DL (ref 0–1.2)
BILIRUB SERPL-MCNC: 0.9 MG/DL (ref 0–1.2)
BLOOD CULTURE, ROUTINE: NORMAL
BLOOD CULTURE, ROUTINE: NORMAL
BUN BLDV-MCNC: 13 MG/DL (ref 6–20)
BUN BLDV-MCNC: 13 MG/DL (ref 6–20)
CALCIUM SERPL-MCNC: 8.1 MG/DL (ref 8.6–10.2)
CALCIUM SERPL-MCNC: 8.1 MG/DL (ref 8.6–10.2)
CHLORIDE BLD-SCNC: 102 MMOL/L (ref 98–107)
CHLORIDE BLD-SCNC: 102 MMOL/L (ref 98–107)
CO2: 26 MMOL/L (ref 22–29)
CO2: 26 MMOL/L (ref 22–29)
CREAT SERPL-MCNC: 0.6 MG/DL (ref 0.7–1.2)
CREAT SERPL-MCNC: 0.6 MG/DL (ref 0.7–1.2)
CULTURE, BLOOD 2: NORMAL
CULTURE, BLOOD 2: NORMAL
EOSINOPHILS ABSOLUTE: 0.2 E9/L (ref 0.05–0.5)
EOSINOPHILS ABSOLUTE: 0.2 E9/L (ref 0.05–0.5)
EOSINOPHILS RELATIVE PERCENT: 2.7 % (ref 0–6)
EOSINOPHILS RELATIVE PERCENT: 2.7 % (ref 0–6)
GFR SERPL CREATININE-BSD FRML MDRD: >60 ML/MIN/1.73
GFR SERPL CREATININE-BSD FRML MDRD: >60 ML/MIN/1.73
GLUCOSE BLD-MCNC: 174 MG/DL (ref 74–99)
GLUCOSE BLD-MCNC: 174 MG/DL (ref 74–99)
HCT VFR BLD CALC: 34.3 % (ref 37–54)
HCT VFR BLD CALC: 34.3 % (ref 37–54)
HEMOGLOBIN: 12.2 G/DL (ref 12.5–16.5)
HEMOGLOBIN: 12.2 G/DL (ref 12.5–16.5)
IMMATURE GRANULOCYTES #: 0.07 E9/L
IMMATURE GRANULOCYTES #: 0.07 E9/L
IMMATURE GRANULOCYTES %: 0.9 % (ref 0–5)
IMMATURE GRANULOCYTES %: 0.9 % (ref 0–5)
LYMPHOCYTES ABSOLUTE: 1.42 E9/L (ref 1.5–4)
LYMPHOCYTES ABSOLUTE: 1.42 E9/L (ref 1.5–4)
LYMPHOCYTES RELATIVE PERCENT: 19.2 % (ref 20–42)
LYMPHOCYTES RELATIVE PERCENT: 19.2 % (ref 20–42)
MCH RBC QN AUTO: 31 PG (ref 26–35)
MCH RBC QN AUTO: 31 PG (ref 26–35)
MCHC RBC AUTO-ENTMCNC: 35.6 % (ref 32–34.5)
MCHC RBC AUTO-ENTMCNC: 35.6 % (ref 32–34.5)
MCV RBC AUTO: 87.1 FL (ref 80–99.9)
MCV RBC AUTO: 87.1 FL (ref 80–99.9)
METER GLUCOSE: 187 MG/DL (ref 74–99)
METER GLUCOSE: 187 MG/DL (ref 74–99)
METER GLUCOSE: 199 MG/DL (ref 74–99)
METER GLUCOSE: 199 MG/DL (ref 74–99)
METER GLUCOSE: 274 MG/DL (ref 74–99)
METER GLUCOSE: 274 MG/DL (ref 74–99)
METER GLUCOSE: 294 MG/DL (ref 74–99)
METER GLUCOSE: 294 MG/DL (ref 74–99)
MONOCYTES ABSOLUTE: 0.42 E9/L (ref 0.1–0.95)
MONOCYTES ABSOLUTE: 0.42 E9/L (ref 0.1–0.95)
MONOCYTES RELATIVE PERCENT: 5.7 % (ref 2–12)
MONOCYTES RELATIVE PERCENT: 5.7 % (ref 2–12)
NEUTROPHILS ABSOLUTE: 5.27 E9/L (ref 1.8–7.3)
NEUTROPHILS ABSOLUTE: 5.27 E9/L (ref 1.8–7.3)
NEUTROPHILS RELATIVE PERCENT: 71.4 % (ref 43–80)
NEUTROPHILS RELATIVE PERCENT: 71.4 % (ref 43–80)
PDW BLD-RTO: 12.2 FL (ref 11.5–15)
PDW BLD-RTO: 12.2 FL (ref 11.5–15)
PLATELET # BLD: 144 E9/L (ref 130–450)
PLATELET # BLD: 144 E9/L (ref 130–450)
PMV BLD AUTO: 11.3 FL (ref 7–12)
PMV BLD AUTO: 11.3 FL (ref 7–12)
POTASSIUM SERPL-SCNC: 3.5 MMOL/L (ref 3.5–5)
POTASSIUM SERPL-SCNC: 3.5 MMOL/L (ref 3.5–5)
PROCALCITONIN: 0.13 NG/ML (ref 0–0.08)
PROCALCITONIN: 0.13 NG/ML (ref 0–0.08)
RBC # BLD: 3.94 E12/L (ref 3.8–5.8)
RBC # BLD: 3.94 E12/L (ref 3.8–5.8)
SODIUM BLD-SCNC: 139 MMOL/L (ref 132–146)
SODIUM BLD-SCNC: 139 MMOL/L (ref 132–146)
TOTAL PROTEIN: 5.9 G/DL (ref 6.4–8.3)
TOTAL PROTEIN: 5.9 G/DL (ref 6.4–8.3)
WBC # BLD: 7.4 E9/L (ref 4.5–11.5)
WBC # BLD: 7.4 E9/L (ref 4.5–11.5)

## 2023-01-02 PROCEDURE — 6370000000 HC RX 637 (ALT 250 FOR IP): Performed by: INTERNAL MEDICINE

## 2023-01-02 PROCEDURE — 2700000000 HC OXYGEN THERAPY PER DAY

## 2023-01-02 PROCEDURE — 80053 COMPREHEN METABOLIC PANEL: CPT

## 2023-01-02 PROCEDURE — 94640 AIRWAY INHALATION TREATMENT: CPT

## 2023-01-02 PROCEDURE — 85025 COMPLETE CBC W/AUTO DIFF WBC: CPT

## 2023-01-02 PROCEDURE — 6370000000 HC RX 637 (ALT 250 FOR IP)

## 2023-01-02 PROCEDURE — 2580000003 HC RX 258: Performed by: INTERNAL MEDICINE

## 2023-01-02 PROCEDURE — 36415 COLL VENOUS BLD VENIPUNCTURE: CPT

## 2023-01-02 PROCEDURE — 94660 CPAP INITIATION&MGMT: CPT

## 2023-01-02 PROCEDURE — 84145 PROCALCITONIN (PCT): CPT

## 2023-01-02 PROCEDURE — 99231 SBSQ HOSP IP/OBS SF/LOW 25: CPT | Performed by: INTERNAL MEDICINE

## 2023-01-02 PROCEDURE — 2060000000 HC ICU INTERMEDIATE R&B

## 2023-01-02 PROCEDURE — 82962 GLUCOSE BLOOD TEST: CPT

## 2023-01-02 PROCEDURE — 6360000002 HC RX W HCPCS: Performed by: INTERNAL MEDICINE

## 2023-01-02 PROCEDURE — S5553 INSULIN LONG ACTING 5 U: HCPCS

## 2023-01-02 RX ORDER — CEFDINIR 300 MG/1
300 CAPSULE ORAL EVERY 12 HOURS SCHEDULED
Status: DISCONTINUED | OUTPATIENT
Start: 2023-01-02 | End: 2023-01-05 | Stop reason: HOSPADM

## 2023-01-02 RX ORDER — DOXYCYCLINE HYCLATE 100 MG/1
100 CAPSULE ORAL EVERY 12 HOURS SCHEDULED
Status: DISCONTINUED | OUTPATIENT
Start: 2023-01-02 | End: 2023-01-05 | Stop reason: HOSPADM

## 2023-01-02 RX ADMIN — DOXYCYCLINE HYCLATE 100 MG: 100 CAPSULE ORAL at 20:47

## 2023-01-02 RX ADMIN — IPRATROPIUM BROMIDE AND ALBUTEROL SULFATE 1 AMPULE: .5; 2.5 SOLUTION RESPIRATORY (INHALATION) at 16:25

## 2023-01-02 RX ADMIN — CEFDINIR 300 MG: 300 CAPSULE ORAL at 13:43

## 2023-01-02 RX ADMIN — IPRATROPIUM BROMIDE AND ALBUTEROL SULFATE 1 AMPULE: .5; 2.5 SOLUTION RESPIRATORY (INHALATION) at 13:57

## 2023-01-02 RX ADMIN — BUDESONIDE 250 MCG: 0.25 INHALANT ORAL at 10:05

## 2023-01-02 RX ADMIN — OSELTAMIVIR PHOSPHATE 75 MG: 75 CAPSULE ORAL at 13:43

## 2023-01-02 RX ADMIN — SODIUM CHLORIDE, PRESERVATIVE FREE 10 ML: 5 INJECTION INTRAVENOUS at 20:50

## 2023-01-02 RX ADMIN — IPRATROPIUM BROMIDE AND ALBUTEROL SULFATE 1 AMPULE: .5; 2.5 SOLUTION RESPIRATORY (INHALATION) at 10:03

## 2023-01-02 RX ADMIN — CEFDINIR 300 MG: 300 CAPSULE ORAL at 20:47

## 2023-01-02 RX ADMIN — PANTOPRAZOLE SODIUM 40 MG: 40 TABLET, DELAYED RELEASE ORAL at 05:18

## 2023-01-02 RX ADMIN — BUDESONIDE 250 MCG: 0.25 INHALANT ORAL at 20:40

## 2023-01-02 RX ADMIN — INSULIN LISPRO 4 UNITS: 100 INJECTION, SOLUTION INTRAVENOUS; SUBCUTANEOUS at 12:04

## 2023-01-02 RX ADMIN — ARFORMOTEROL TARTRATE 15 MCG: 15 SOLUTION RESPIRATORY (INHALATION) at 10:04

## 2023-01-02 RX ADMIN — IPRATROPIUM BROMIDE AND ALBUTEROL SULFATE 1 AMPULE: .5; 2.5 SOLUTION RESPIRATORY (INHALATION) at 20:40

## 2023-01-02 RX ADMIN — Medication 100 MG: at 08:41

## 2023-01-02 RX ADMIN — Medication 1 MG: at 08:39

## 2023-01-02 RX ADMIN — INSULIN GLARGINE-YFGN 10 UNITS: 100 INJECTION, SOLUTION SUBCUTANEOUS at 20:47

## 2023-01-02 RX ADMIN — SALINE NASAL SPRAY 1 SPRAY: 1.5 SOLUTION NASAL at 20:47

## 2023-01-02 RX ADMIN — DOXYCYCLINE HYCLATE 100 MG: 100 CAPSULE ORAL at 11:50

## 2023-01-02 RX ADMIN — ARFORMOTEROL TARTRATE 15 MCG: 15 SOLUTION RESPIRATORY (INHALATION) at 20:40

## 2023-01-02 ASSESSMENT — PAIN SCALES - GENERAL: PAINLEVEL_OUTOF10: 0

## 2023-01-02 NOTE — PROGRESS NOTES
[facility]  Internal Medicine Residency Program  Progress Note - House Team     Patient:  Leah Machado 47 y.o. male MRN: [id number]     Date of Service: 1/2/2023     CC: shortness of breath    Days since admission: 5    Subjective     Overnight events: no significant overnight events reported    Patient was seen and examined at bedside this morning. He stated that his breathing had improved and was excited that he was down to 8L oxygen. Stated he can no longer tolerate IV antibiotics as he is having too many problems with his IV and it is causing pain in his arm. Denied shortness of breath, chest pain, nausea, vomiting. Objective     Physical Exam:  Vitals: BP (!) 113/54   Pulse 85   Temp 98.5 °F (36.9 °C) (Temporal)   Resp 16   Ht 6' 1\" (1.854 m)   Wt 180 lb (81.6 kg)   SpO2 94%   BMI 23.75 kg/m²     I & O - 24hr:   Intake/Output Summary (Last 24 hours) at 1/2/2023 0751  Last data filed at 1/1/2023 2201  Gross per 24 hour   Intake 0 ml   Output --   Net 0 ml      General Appearance: alert, appears stated age, and cooperative  HEENT:  Head: Normocephalic, no lesions, without obvious abnormality. Neck: supple, symmetrical, trachea midline  Lung: Reduced breath sounds in bilateral lung fields, slight rhonchi; no wheezing   Heart: regular rate and rhythm, S1, S2 normal, no murmur, click, rub or gallop  Abdomen: soft, non-tender; bowel sounds normal; no masses,  no organomegaly  Extremities:  extremities normal, atraumatic, no cyanosis or edema  Musculokeletal: No joint swelling, no muscle tenderness. ROM normal in all joints of extremities.    Neurologic: Mental status: Alert, oriented, thought content appropriate  Subject  Pertinent Information & Imaging Studies, Consults   stacy  CBC:   Lab Results   Component Value Date/Time    WBC 7.4 01/02/2023 06:26 AM    RBC 3.94 01/02/2023 06:26 AM    HGB 12.2 01/02/2023 06:26 AM    HCT 34.3 01/02/2023 06:26 AM    MCV 87.1 01/02/2023 06:26 AM MCH 31.0 01/02/2023 06:26 AM    MCHC 35.6 01/02/2023 06:26 AM    RDW 12.2 01/02/2023 06:26 AM     01/02/2023 06:26 AM    MPV 11.3 01/02/2023 06:26 AM     CMP:    Lab Results   Component Value Date/Time     01/02/2023 06:26 AM    K 3.5 01/02/2023 06:26 AM    K 3.9 12/28/2022 03:36 PM     01/02/2023 06:26 AM    CO2 26 01/02/2023 06:26 AM    BUN 13 01/02/2023 06:26 AM    CREATININE 0.6 01/02/2023 06:26 AM    LABGLOM >60 01/02/2023 06:26 AM    GLUCOSE 174 01/02/2023 06:26 AM    PROT 5.9 01/02/2023 06:26 AM    LABALBU 3.0 01/02/2023 06:26 AM    CALCIUM 8.1 01/02/2023 06:26 AM    BILITOT 0.9 01/02/2023 06:26 AM    ALKPHOS 51 01/02/2023 06:26 AM    AST 22 01/02/2023 06:26 AM    ALT 31 01/02/2023 06:26 AM       IMAGING:   Imaging Studies:    XR CHEST PORTABLE    Result Date: 12/31/2022  Persistent but perhaps slightly decreased bilateral interstitial opacities. Lung hyperinflation and coarse interstitial markings. Atherosclerotic disease. XR CHEST PORTABLE    Result Date: 12/28/2022  Bilateral lung opacities right greater than left. CTA PULMONARY W CONTRAST    Result Date: 12/28/2022  1. Severe diffuse ground-glass opacities in both lungs with relative sparing of the bilateral lung apices. The distribution is fairly symmetric. There are scattered areas of interstitial prominence. (The imaging features are most suggestive of acute lung disease in that clinical setting. ARDS could have this appearance.) 2. Prominent mediastinal and hilar lymph nodes. 3.  No evidence of pulmonary embolism. No evidence of right heart strain. 4.  Atherosclerotic disease. 5.  Remainder of the study is as above. RECOMMENDATIONS: Recommend pulmonology consultation. Follow-up chest CT in 10-12 weeks also suggested.             Notable Cultures:      Blood cultures   Blood Culture, Routine   Date Value Ref Range Status   12/28/2022 24 Hours no growth  Preliminary     Respiratory cultures No results found for: RESPCULTURE No results found for: LABGRAM  Urine   Urine Culture, Routine   Date Value Ref Range Status   12/28/2022 Growth not present  Final     Legionella No results found for: LABLEGI  C Diff PCR No results found for: CDIFPCR  Wound culture/abscess: No results for input(s): WNDABS in the last 72 hours. Tip culture:No results for input(s): CXCATHTIP in the last 72 hours. Antibiotic  Days  Day started   ceftriaxone     doxycyline                        OXYGENATION: 8 L HFNC    DIET: 4 carb choice        Resident's Assessment and Isi Indio is a 47 y.o. male with  has a past medical history of COPD (chronic obstructive pulmonary disease) ([facility] [address] Diabetes mellitus ([facility] [address] Hyperlipidemia, and Hypertension. came here with CC   Chief Complaint   Patient presents with    Shortness of Breath     Patient battling influenza since 12/23, was in respiratory distress at home today 35% RA. Days since admission: 5    Consults:   pulmonology    Assessment & Plan  Pulmonary  Acute hypoxic respiratory failure 2/2 influenza with superimposed CAP  CTA: severe bilateral diffuse ground glass opacities  Respiratory panel, legionella negative  PLAN  Pulmonology following  transition antibiotics to PO doxycycline and cefdinir  Continue breathing treatments: brovana, pulmicort, duonebs  Continue oxygen supplementation, wean as tolerated   Tamiflu course to be completed today    Cardiology  Hx HTN  Previously on lisinoprol however patient has not been taking at home  Continue to monitor BP; 122/53 this AM    2.  Hx HLD  Previously on statin however no longer taking  Lipid panel (12/29/22): elevated TG at 193  Diet modifications encouraged    GI  Transaminitis in setting of alcohol use- resolved    Nephro  HAGMA 2/2 lactic acidosis - resolved    Endocrine  NIDDM  Previously on metformin & glipizide however has not been taking  PLAN  Continue POCT glucose chest AC/HS  Continue lantus 10 units nightly   Continue MDSSI  Hypoglycemia protocol     Psych  Hx tobacco use  Smokes 1-1.5 ppd for 32 years  CT on admission with significant ground glass opacities and mediastinal lymphadenopathy   PLAN  Continue nicotine patch  Will need CT reassessment after influenza infection resolves    2.  Hx alcohol abuse  Patient drinks 2-3 beers daily  Elevated transaminases on admission  PLAN  Continue to monitor for withdrawal symptoms  Encourage cessation      PT/OT: not indicated at this time  DVT ppx: lovenox  GI ppx: protonix      Next of Kin/ POA:  [employer]  [phone])    Code Status:   Full code     Disposition:continue current care      Yoselyn Ham MD, PGY-1  Attending physician: Dr. Geri Torres

## 2023-01-02 NOTE — PROGRESS NOTES
[facility]  Internal Medicine Residency / [address]    Attending Physician Statement  I have discussed the case, including pertinent history and exam findings with the resident and the team.  I have seen and examined the patient and the key elements of the encounter have been performed by me. I agree with the assessment, plan and orders as documented by the resident. Case Discussed During AM Rounds   Covering for Dr. Aguillon July and House Team 2 this weekend    Oxygen needs continues to decrease- continue oxygen wean protocol    Continued improvement in respiratory status    Patient refusing IV atbs at this time- convert to PO to complete course likely   States feeling improved overall    Will need work-up further for significant previous weight loss   Explained at bedside- outside food is leading to further hyperglycemia and need to monitor     Acute Hypoxic Respiratory Failure with likely concerning for Influenza Pneumonia with potential secondary bacterial infection    Continued clinical improvement- oxygen wean protocol continued  Any respiratory decompensation- resume BiPAP use    Monitoring mentation closely- remains stable    Continue Tamiflu until stop date    Remain off IV steroids at this time   Continue antibiotics for likely superimposed- patient refusing further IV atbs and will need to convert to PO to complete course   Continue Breathing treatments     Pulmonary following and appreciate input     Abnormal CT of chest    Significant mediastinal adenopathy    Repeat CT to be completed after treatment per Pulmonary     Hx of EtOH use    Monitor for any signs of DTs- currently remains without any concerns for withdrawal    Currently no findings    Unintentional weight loss with worsening diarrhea   Further work-up will be needed as outpatient     Thrombocytopenia- resolved     Remainder of medical problems as per resident note.       Collins Driver MD, [facility]

## 2023-01-02 NOTE — PROGRESS NOTES
Pulmonary Progress Note    Admit Date: 2022                            PCP: No primary care provider on file. Principal Problem:    COPD with acute exacerbation ([facility]  Active Problems:    COPD exacerbation ([facility]  Resolved Problems:    * No resolved hospital problems. *      Subjective:  Resting in bed on 9LHF. Feeling much better today     Medications:   sodium chloride      dextrose      sodium chloride          pantoprazole  40 mg Oral QAM AC    insulin glargine  10 Units SubCUTAneous Nightly    insulin lispro  0-8 Units SubCUTAneous TID WC    insulin lispro  0-4 Units SubCUTAneous Nightly    sodium chloride flush  5-40 mL IntraVENous 2 times per day    Arformoterol Tartrate  15 mcg Nebulization BID    budesonide  250 mcg Nebulization BID    hydrOXYzine  25 mg IntraMUSCular Once    oseltamivir  75 mg Oral BID    sodium chloride flush  5-40 mL IntraVENous 2 times per day    enoxaparin  40 mg SubCUTAneous Daily    nicotine  1 patch TransDERmal Daily    cefTRIAXone (ROCEPHIN) IV  1,000 mg IntraVENous Q24H    doxycycline (VIBRAMYCIN) IV  100 mg IntraVENous Q12H    ipratropium-albuterol  1 ampule Inhalation Q4H WA    thiamine  100 mg Oral Daily    folic acid  1 mg Oral Daily       Vitals:  VITALS:  BP (!) 113/54   Pulse 85   Temp 98.5 °F (36.9 °C) (Temporal)   Resp 16   Ht 6' 1\" (1.854 m)   Wt 180 lb (81.6 kg)   SpO2 94%   BMI 23.75 kg/m²   24HR INTAKE/OUTPUT:    Intake/Output Summary (Last 24 hours) at 2023 0841  Last data filed at 2023 2201  Gross per 24 hour   Intake 0 ml   Output --   Net 0 ml     CURRENT PULSE OXIMETRY:  SpO2: 94 %  24HR PULSE OXIMETRY RANGE:  SpO2  Av %  Min: 94 %  Max: 98 %  CVP:    VENT SETTINGS:      Additional Respiratory Assessments  Heart Rate: 85  Resp: 16  SpO2: 94 %      EXAM:  General: Alert, in NAD  ENT: No discharge. Pharynx clear. membranes moist   Neck: Supple, Trachea midline. Resp: No accessory muscle use. Non-labored.   Lungs diminished   CV: Regular rate. Regular rhythm. No murmur . No edema. ABD: Non-tender. Non-distended. Soft, round . Normal bowel sounds. Skin: Warm and dry. M/S: No cyanosis. No joint deformity. No clubbing. Neuro: Awake. Follows commands. O x 3, MICHAEL  Psych:  calm and interactive     I/O: No intake/output data recorded. No intake/output data recorded. Results:  CBC:   Recent Labs     12/31/22 0719 01/01/23 0553 01/02/23 0626   WBC 7.4 8.5 7.4   HGB 11.5* 11.6* 12.2*   HCT 33.7* 33.3* 34.3*   MCV 91.1 90.2 87.1    132 144     BMP:   Recent Labs     12/31/22 0719 01/01/23 0553 01/02/23 0626    139 139   K 4.4 3.6 3.5    102 102   CO2 26 27 26   BUN 17 17 13   CREATININE 0.6* 0.6* 0.6*     LFT:   Recent Labs     12/31/22 0719 01/01/23 0553 01/02/23 0626   ALKPHOS 55 52 51   ALT 34 35 31   AST 50* 34 22   PROT 6.1* 5.9* 5.9*   BILITOT 0.5 0.6 0.9   LABALBU 3.1* 3.0* 3.0*     PT/INR: No results for input(s): PROTIME, INR in the last 72 hours. Procalcitonin:    Latest Reference Range & Units 12/29/22 05:52   Procalcitonin 0.00 - 0.08 ng/mL 0.97 (H)   (H): Data is abnormally high  Cultures:   Latest Reference Range & Units 12/28/22 15:36   INFLUENZA A NOT DETECTED  DETECTED ! INFLUENZA B NOT DETECTED  NOT DETECTED   !: Data is abnormal      ABG:   Recent Labs     12/31/22  1407   PH 7.499*   PO2 63.1*   PCO2 31.6*   HCO3 24.0   BE 1.4   O2SAT 92.0       Films:  XR CHEST PORTABLE    Result Date: 12/28/2022  EXAMINATION: ONE XRAY VIEW OF THE CHEST 12/28/2022 3:52 pm COMPARISON: None. HISTORY: ORDERING SYSTEM PROVIDED HISTORY: hypoxic TECHNOLOGIST PROVIDED HISTORY: Reason for exam:->hypoxic What reading provider will be dictating this exam?->CRC FINDINGS: Bilateral lung opacities right greater than left. There is no effusion or pneumothorax. The cardiomediastinal silhouette is without acute process. The osseous structures are without acute process.      Bilateral lung opacities right greater than left.     CTA PULMONARY W CONTRAST    Result Date: 12/28/2022  EXAMINATION: CTA OF THE CHEST 12/28/2022 4:11 pm TECHNIQUE: CTA of the chest was performed after the administration of intravenous contrast.  Multiplanar reformatted images are provided for review. MIP images are provided for review. Automated exposure control, iterative reconstruction, and/or weight based adjustment of the mA/kV was utilized to reduce the radiation dose to as low as reasonably achievable. COMPARISON: Chest series from the same day HISTORY: ORDERING SYSTEM PROVIDED HISTORY: hypoxic, concern for pe TECHNOLOGIST PROVIDED HISTORY: Reason for exam:->hypoxic, concern for pe Decision Support Exception - unselect if not a suspected or confirmed emergency medical condition->Emergency Medical Condition (MA) What reading provider will be dictating this exam?->CRC FINDINGS: Pulmonary Arteries: Pulmonary arteries are adequately opacified for evaluation. No evidence of intraluminal filling defect to suggest pulmonary embolism. Main pulmonary artery is normal in caliber. Mediastinum: There are prominent mediastinal and hilar lymph nodes. AP window lymph node series 301, image 92 measures 14 mm in short axis. Conglomerate lymphadenopathy in the subcarinal region measures approximately 4.8 cm on series 301, image 126. Poorly defined bilateral hilar lymph nodes. Scattered atherosclerotic plaque in the thoracic aorta and branch vessels. No aneurysmal dilatation. Heart chambers are not enlarged. No pericardial effusion. No evidence of right heart strain. Normal course and appearance of the esophagus. Lungs/pleura: Airway is patent. No endobronchial lesions. Diffuse ground-glass opacities with scattered interstitial prominence. No pleural effusion or pneumothorax. Upper Abdomen: Limited images of the upper abdomen are unremarkable. Soft Tissues/Bones: The thyroid gland and remaining soft tissue structures of the neck appear unremarkable.   No concerning axillary lymph nodes. The anterior and posterior chest wall is unremarkable. 1.  Severe diffuse ground-glass opacities in both lungs with relative sparing of the bilateral lung apices. The distribution is fairly symmetric. There are scattered areas of interstitial prominence. (The imaging features are most suggestive of acute lung disease in that clinical setting. ARDS could have this appearance.) 2. Prominent mediastinal and hilar lymph nodes. 3.  No evidence of pulmonary embolism. No evidence of right heart strain. 4.  Atherosclerotic disease. 5.  Remainder of the study is as above. RECOMMENDATIONS: Recommend pulmonology consultation. Follow-up chest CT in 10-12 weeks also suggested. 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 With extensive groundglass changes  Legionella strep pneumonia viral panel otherwise negative  HIV negative  12/29 Pro-Tobin elevated  12/30 on 15 L  12/31 Tmin 96.4 on 15 L saturating 94% 7.49/31/63/24/93%    1/1/23 on 15 liters saturations 93%   Chest film decreased infiltrates   1/2/22:9L     Acute hypoxic respiratory failure  Influenza A positive on Tamiflu  COPD   Superimposed community-acquired pneumonia on doxycycline ,and ceftriaxone   Mediastinal adenopathy  Nicotine addiction  Lactic acidosis on admission  Thrombocytopenia history of EtOH  Elevated LFTs  DM with elevated blood sugars due to history  HLD  HTN  DVT prophylaxis with Lovenox       Plan:    On ceftriaxone and doxycycline, pct .97, repeat today   Maintain POX >90%, down from 15 to 9L today. Continue to wean will need to be 5L with ambulating for DC  Chest x-ray reviewed and  clearing   Saline nasal spray to bedside  On Tamiflu  Check alpha-1 antitrypsin  Will need repeat CT after treatment to follow-up with the mediastinal adenopathy  Continue nebs  Supportive care            Electronically signed by LEON Ridley on 1/2/2023 at 8:41 AM    Attending Attestation Note:    Patient seen and examined with Hospital Staff, NP. I have extensively reviewed the chart lab work and imaging. I agree with above. Modifications and amendment of note made as necessary.     In addition, the following apply:    Current Facility-Administered Medications   Medication Dose Route Frequency Provider Last Rate Last Admin    doxycycline hyclate (VIBRAMYCIN) capsule 100 mg  100 mg Oral 2 times per day Luzma Adkins MD   100 mg at 01/02/23 1150    cefdinir (OMNICEF) capsule 300 mg  300 mg Oral 2 times per day Luzma Adkins MD   300 mg at 01/02/23 1343    pantoprazole (PROTONIX) tablet 40 mg  40 mg Oral QAM AC Mariah Brock MD   40 mg at 01/02/23 0518    insulin glargine-yfgn (SEMGLEE-YFGN) injection vial 10 Units  10 Units SubCUTAneous Nightly Concetta Doll,    10 Units at 01/01/23 2110    insulin lispro (HUMALOG) injection vial 0-8 Units  0-8 Units SubCUTAneous TID  Mariam Yousif MD   4 Units at 01/02/23 1204    insulin lispro (HUMALOG) injection vial 0-4 Units  0-4 Units SubCUTAneous Nightly Mariam Yousif MD        sodium chloride (OCEAN, BABY AYR) 0.65 % nasal spray 1 spray  1 spray Each Nostril PRN Ofelia Martel MD        sodium chloride flush 0.9 % injection 5-40 mL  5-40 mL IntraVENous 2 times per day Rolo Rob., DO   10 mL at 01/01/23 2110    sodium chloride flush 0.9 % injection 5-40 mL  5-40 mL IntraVENous PRN Rolo Rivas., DO        0.9 % sodium chloride infusion   IntraVENous PRN Rolo Rivas., DO        glucose chewable tablet 16 g  4 tablet Oral PRN Kimberly Fernandez MD        dextrose bolus 10% 125 mL  125 mL IntraVENous PRN Kimberly Fernandez MD        Or    dextrose bolus 10% 250 mL  250 mL IntraVENous PRN Kimberly Fernandez MD        glucagon (rDNA) injection 1 mg  1 mg SubCUTAneous PRN Kimberly Fernandez MD        dextrose 10 % infusion   IntraVENous Continuous PRN Inez Rios MD        Arformoterol Tartrate [facility]) nebulizer solution 15 mcg  15 mcg Nebulization BID Jose Lynn., DO   15 mcg at 01/02/23 1004    budesonide (PULMICORT) nebulizer suspension 250 mcg  250 mcg Nebulization BID Bourne Freddyrafiq Zhou Ishnickieara., DO   250 mcg at 01/02/23 1005    hydrOXYzine (VISTARIL) injection 25 mg  25 mg IntraMUSCular Once Leonila Lundberg MD        oseltamivir (TAMIFLU) capsule 75 mg  75 mg Oral BID Alexus Cart, DO   75 mg at 01/02/23 1343    sodium chloride flush 0.9 % injection 5-40 mL  5-40 mL IntraVENous 2 times per day Kimberly Fernandez MD   10 mL at 12/31/22 0915    sodium chloride flush 0.9 % injection 5-40 mL  5-40 mL IntraVENous PRN Kimberly Fernandez MD        0.9 % sodium chloride infusion   IntraVENous PRN Kimberly Fernandez MD        enoxaparin (LOVENOX) injection 40 mg  40 mg SubCUTAneous Daily Kimberly Fernandez MD   40 mg at 12/31/22 0914    polyethylene glycol (GLYCOLAX) packet 17 g  17 g Oral Daily PRN Kimberly Fernandez MD        acetaminophen (TYLENOL) tablet 650 mg  650 mg Oral Q6H PRN Kimberly Fernandez MD   650 mg at 01/01/23 2019    Or    acetaminophen (TYLENOL) suppository 650 mg  650 mg Rectal Q6H PRN Kimberly Fernandez MD        nicotine (NICODERM CQ) 21 MG/24HR 1 patch  1 patch TransDERmal Daily Kimberly Fernandez MD   1 patch at 01/02/23 0841    ipratropium-albuterol (DUONEB) nebulizer solution 1 ampule  1 ampule Inhalation Q4H WA Kimberly Fernandez MD   1 ampule at 01/02/23 1625    ondansetron (ZOFRAN) injection 4 mg  4 mg IntraVENous Q6H PRN Catia Roman MD        Or    ondansetron (ZOFRAN-ODT) disintegrating tablet 4 mg  4 mg Oral Q8H PRN Catia Roman MD        thiamine tablet 100 mg  100 mg Oral Daily Catia Roman MD   100 mg at 89/98/46 4544    folic acid (FOLVITE) tablet 1 mg  1 mg Oral Daily Catia Roman MD   1 mg at 01/02/23 3465      - A1AT, IgE  - Tamiflu, Ceftriaxone, Doxycycline, supportive care   - O2, IS  - await D/C planning   - no role for bronchoscopy or thoracentesis at this time    Kati Roque MD  1/2/2023  4:53 PM

## 2023-01-03 PROBLEM — J10.1 INFLUENZA A: Status: ACTIVE | Noted: 2023-01-03

## 2023-01-03 PROBLEM — J96.01 ACUTE RESPIRATORY FAILURE WITH HYPOXIA (HCC): Status: ACTIVE | Noted: 2023-01-03

## 2023-01-03 LAB
ALBUMIN SERPL-MCNC: 2.7 G/DL (ref 3.5–5.2)
ALBUMIN SERPL-MCNC: 2.7 G/DL (ref 3.5–5.2)
ALP BLD-CCNC: 48 U/L (ref 40–129)
ALP BLD-CCNC: 48 U/L (ref 40–129)
ALT SERPL-CCNC: 25 U/L (ref 0–40)
ALT SERPL-CCNC: 25 U/L (ref 0–40)
ANION GAP SERPL CALCULATED.3IONS-SCNC: 9 MMOL/L (ref 7–16)
ANION GAP SERPL CALCULATED.3IONS-SCNC: 9 MMOL/L (ref 7–16)
AST SERPL-CCNC: 24 U/L (ref 0–39)
AST SERPL-CCNC: 24 U/L (ref 0–39)
BASOPHILS ABSOLUTE: 0.01 E9/L (ref 0–0.2)
BASOPHILS ABSOLUTE: 0.01 E9/L (ref 0–0.2)
BASOPHILS RELATIVE PERCENT: 0.1 % (ref 0–2)
BASOPHILS RELATIVE PERCENT: 0.1 % (ref 0–2)
BILIRUB SERPL-MCNC: 0.9 MG/DL (ref 0–1.2)
BILIRUB SERPL-MCNC: 0.9 MG/DL (ref 0–1.2)
BUN BLDV-MCNC: 13 MG/DL (ref 6–20)
BUN BLDV-MCNC: 13 MG/DL (ref 6–20)
CALCIUM SERPL-MCNC: 8.2 MG/DL (ref 8.6–10.2)
CALCIUM SERPL-MCNC: 8.2 MG/DL (ref 8.6–10.2)
CHLORIDE BLD-SCNC: 98 MMOL/L (ref 98–107)
CHLORIDE BLD-SCNC: 98 MMOL/L (ref 98–107)
CO2: 26 MMOL/L (ref 22–29)
CO2: 26 MMOL/L (ref 22–29)
CREAT SERPL-MCNC: 0.6 MG/DL (ref 0.7–1.2)
CREAT SERPL-MCNC: 0.6 MG/DL (ref 0.7–1.2)
EOSINOPHILS ABSOLUTE: 0.21 E9/L (ref 0.05–0.5)
EOSINOPHILS ABSOLUTE: 0.21 E9/L (ref 0.05–0.5)
EOSINOPHILS RELATIVE PERCENT: 2.6 % (ref 0–6)
EOSINOPHILS RELATIVE PERCENT: 2.6 % (ref 0–6)
GFR SERPL CREATININE-BSD FRML MDRD: >60 ML/MIN/1.73
GFR SERPL CREATININE-BSD FRML MDRD: >60 ML/MIN/1.73
GLUCOSE BLD-MCNC: 176 MG/DL (ref 74–99)
GLUCOSE BLD-MCNC: 176 MG/DL (ref 74–99)
HCT VFR BLD CALC: 35.1 % (ref 37–54)
HCT VFR BLD CALC: 35.1 % (ref 37–54)
HEMOGLOBIN: 11.9 G/DL (ref 12.5–16.5)
HEMOGLOBIN: 11.9 G/DL (ref 12.5–16.5)
IMMATURE GRANULOCYTES #: 0.08 E9/L
IMMATURE GRANULOCYTES #: 0.08 E9/L
IMMATURE GRANULOCYTES %: 1 % (ref 0–5)
IMMATURE GRANULOCYTES %: 1 % (ref 0–5)
LYMPHOCYTES ABSOLUTE: 1.32 E9/L (ref 1.5–4)
LYMPHOCYTES ABSOLUTE: 1.32 E9/L (ref 1.5–4)
LYMPHOCYTES RELATIVE PERCENT: 16.2 % (ref 20–42)
LYMPHOCYTES RELATIVE PERCENT: 16.2 % (ref 20–42)
MCH RBC QN AUTO: 31 PG (ref 26–35)
MCH RBC QN AUTO: 31 PG (ref 26–35)
MCHC RBC AUTO-ENTMCNC: 33.9 % (ref 32–34.5)
MCHC RBC AUTO-ENTMCNC: 33.9 % (ref 32–34.5)
MCV RBC AUTO: 91.4 FL (ref 80–99.9)
MCV RBC AUTO: 91.4 FL (ref 80–99.9)
METER GLUCOSE: 221 MG/DL (ref 74–99)
METER GLUCOSE: 221 MG/DL (ref 74–99)
METER GLUCOSE: 227 MG/DL (ref 74–99)
METER GLUCOSE: 227 MG/DL (ref 74–99)
METER GLUCOSE: 257 MG/DL (ref 74–99)
METER GLUCOSE: 257 MG/DL (ref 74–99)
METER GLUCOSE: 282 MG/DL (ref 74–99)
METER GLUCOSE: 282 MG/DL (ref 74–99)
MONOCYTES ABSOLUTE: 0.52 E9/L (ref 0.1–0.95)
MONOCYTES ABSOLUTE: 0.52 E9/L (ref 0.1–0.95)
MONOCYTES RELATIVE PERCENT: 6.4 % (ref 2–12)
MONOCYTES RELATIVE PERCENT: 6.4 % (ref 2–12)
NEUTROPHILS ABSOLUTE: 6 E9/L (ref 1.8–7.3)
NEUTROPHILS ABSOLUTE: 6 E9/L (ref 1.8–7.3)
NEUTROPHILS RELATIVE PERCENT: 73.7 % (ref 43–80)
NEUTROPHILS RELATIVE PERCENT: 73.7 % (ref 43–80)
PDW BLD-RTO: 12.2 FL (ref 11.5–15)
PDW BLD-RTO: 12.2 FL (ref 11.5–15)
PLATELET # BLD: 142 E9/L (ref 130–450)
PLATELET # BLD: 142 E9/L (ref 130–450)
PMV BLD AUTO: 11.2 FL (ref 7–12)
PMV BLD AUTO: 11.2 FL (ref 7–12)
POTASSIUM SERPL-SCNC: 3.6 MMOL/L (ref 3.5–5)
POTASSIUM SERPL-SCNC: 3.6 MMOL/L (ref 3.5–5)
RBC # BLD: 3.84 E12/L (ref 3.8–5.8)
RBC # BLD: 3.84 E12/L (ref 3.8–5.8)
SODIUM BLD-SCNC: 133 MMOL/L (ref 132–146)
SODIUM BLD-SCNC: 133 MMOL/L (ref 132–146)
TOTAL PROTEIN: 6.1 G/DL (ref 6.4–8.3)
TOTAL PROTEIN: 6.1 G/DL (ref 6.4–8.3)
WBC # BLD: 8.1 E9/L (ref 4.5–11.5)
WBC # BLD: 8.1 E9/L (ref 4.5–11.5)

## 2023-01-03 PROCEDURE — 6370000000 HC RX 637 (ALT 250 FOR IP)

## 2023-01-03 PROCEDURE — 6370000000 HC RX 637 (ALT 250 FOR IP): Performed by: INTERNAL MEDICINE

## 2023-01-03 PROCEDURE — 6360000002 HC RX W HCPCS: Performed by: INTERNAL MEDICINE

## 2023-01-03 PROCEDURE — 2060000000 HC ICU INTERMEDIATE R&B

## 2023-01-03 PROCEDURE — 82962 GLUCOSE BLOOD TEST: CPT

## 2023-01-03 PROCEDURE — 2580000003 HC RX 258: Performed by: INTERNAL MEDICINE

## 2023-01-03 PROCEDURE — 36415 COLL VENOUS BLD VENIPUNCTURE: CPT

## 2023-01-03 PROCEDURE — 94640 AIRWAY INHALATION TREATMENT: CPT

## 2023-01-03 PROCEDURE — 99232 SBSQ HOSP IP/OBS MODERATE 35: CPT | Performed by: INTERNAL MEDICINE

## 2023-01-03 PROCEDURE — 94660 CPAP INITIATION&MGMT: CPT

## 2023-01-03 PROCEDURE — 2700000000 HC OXYGEN THERAPY PER DAY

## 2023-01-03 PROCEDURE — 80053 COMPREHEN METABOLIC PANEL: CPT

## 2023-01-03 PROCEDURE — S5553 INSULIN LONG ACTING 5 U: HCPCS

## 2023-01-03 PROCEDURE — 85025 COMPLETE CBC W/AUTO DIFF WBC: CPT

## 2023-01-03 PROCEDURE — 2580000003 HC RX 258

## 2023-01-03 RX ORDER — DOXYCYCLINE HYCLATE 100 MG/1
100 CAPSULE ORAL EVERY 12 HOURS SCHEDULED
Qty: 3 CAPSULE | Refills: 0 | OUTPATIENT
Start: 2023-01-03 | End: 2023-01-04

## 2023-01-03 RX ORDER — LANOLIN ALCOHOL/MO/W.PET/CERES
100 CREAM (GRAM) TOPICAL DAILY
Qty: 30 TABLET | Refills: 3 | OUTPATIENT
Start: 2023-01-04

## 2023-01-03 RX ORDER — CEFDINIR 300 MG/1
300 CAPSULE ORAL EVERY 12 HOURS SCHEDULED
Qty: 3 CAPSULE | Refills: 0 | OUTPATIENT
Start: 2023-01-03 | End: 2023-01-05

## 2023-01-03 RX ORDER — FOLIC ACID 1 MG/1
1 TABLET ORAL DAILY
Qty: 30 TABLET | Refills: 3 | OUTPATIENT
Start: 2023-01-04

## 2023-01-03 RX ORDER — NICOTINE 21 MG/24HR
1 PATCH, TRANSDERMAL 24 HOURS TRANSDERMAL DAILY
Qty: 30 PATCH | Refills: 3 | OUTPATIENT
Start: 2023-01-04

## 2023-01-03 RX ADMIN — ARFORMOTEROL TARTRATE 15 MCG: 15 SOLUTION RESPIRATORY (INHALATION) at 09:29

## 2023-01-03 RX ADMIN — IPRATROPIUM BROMIDE AND ALBUTEROL SULFATE 1 AMPULE: .5; 2.5 SOLUTION RESPIRATORY (INHALATION) at 17:10

## 2023-01-03 RX ADMIN — CEFDINIR 300 MG: 300 CAPSULE ORAL at 21:15

## 2023-01-03 RX ADMIN — INSULIN GLARGINE-YFGN 10 UNITS: 100 INJECTION, SOLUTION SUBCUTANEOUS at 21:09

## 2023-01-03 RX ADMIN — BUDESONIDE 250 MCG: 0.25 INHALANT ORAL at 09:30

## 2023-01-03 RX ADMIN — ARFORMOTEROL TARTRATE 15 MCG: 15 SOLUTION RESPIRATORY (INHALATION) at 21:29

## 2023-01-03 RX ADMIN — IPRATROPIUM BROMIDE AND ALBUTEROL SULFATE 1 AMPULE: .5; 2.5 SOLUTION RESPIRATORY (INHALATION) at 13:47

## 2023-01-03 RX ADMIN — IPRATROPIUM BROMIDE AND ALBUTEROL SULFATE 1 AMPULE: .5; 2.5 SOLUTION RESPIRATORY (INHALATION) at 09:31

## 2023-01-03 RX ADMIN — PANTOPRAZOLE SODIUM 40 MG: 40 TABLET, DELAYED RELEASE ORAL at 05:07

## 2023-01-03 RX ADMIN — DOXYCYCLINE HYCLATE 100 MG: 100 CAPSULE ORAL at 21:09

## 2023-01-03 RX ADMIN — CEFDINIR 300 MG: 300 CAPSULE ORAL at 08:35

## 2023-01-03 RX ADMIN — SODIUM CHLORIDE, PRESERVATIVE FREE 10 ML: 5 INJECTION INTRAVENOUS at 21:15

## 2023-01-03 RX ADMIN — IPRATROPIUM BROMIDE AND ALBUTEROL SULFATE 1 AMPULE: .5; 2.5 SOLUTION RESPIRATORY (INHALATION) at 21:29

## 2023-01-03 RX ADMIN — Medication 1 MG: at 08:35

## 2023-01-03 RX ADMIN — Medication 100 MG: at 08:35

## 2023-01-03 RX ADMIN — BUDESONIDE 250 MCG: 0.25 INHALANT ORAL at 21:29

## 2023-01-03 RX ADMIN — SODIUM CHLORIDE, PRESERVATIVE FREE 10 ML: 5 INJECTION INTRAVENOUS at 08:41

## 2023-01-03 RX ADMIN — INSULIN LISPRO 2 UNITS: 100 INJECTION, SOLUTION INTRAVENOUS; SUBCUTANEOUS at 16:40

## 2023-01-03 RX ADMIN — INSULIN LISPRO 4 UNITS: 100 INJECTION, SOLUTION INTRAVENOUS; SUBCUTANEOUS at 13:10

## 2023-01-03 RX ADMIN — DOXYCYCLINE HYCLATE 100 MG: 100 CAPSULE ORAL at 08:35

## 2023-01-03 ASSESSMENT — PAIN SCALES - GENERAL: PAINLEVEL_OUTOF10: 0

## 2023-01-03 NOTE — PROGRESS NOTES
[facility]  Internal Medicine Residency / [address]    Attending Physician Statement  I have discussed the case, including pertinent history and exam findings with the resident and the team.  I have seen and examined the patient and the key elements of the encounter have been performed by me. I have also personally reviewed imaging studies and labs. I agree with the assessment, plan and orders as documented by the resident. SOB significantly improved. Down to 6L HFNC this morning, breath sounds are clear. Assessment:  Acute hypoxic respiratory failure 2/2 influenza A with 2/2 bacterial infection. He has not been smoking since admission and he plans to continue to be off tobacco use after d/c. COPD, not in exacerbation  Mediastinal LN  HTN, controlled  DM, non-insulin requiring. A1c 7.8 this admission. Tolerating basal insulin however continues to have BS above goal, likely 2/2 to diet. Hx of alcohol use          Plan: Will need to set up with oxygen and HHC prior to discharge  Total of 7 days for abx  Rpt CT outpatient  Outpatient work up for weight loss  Monitor BS        Remainder of medical problems as per resident note. Matt Beckford MD  Internal Medicine

## 2023-01-03 NOTE — DISCHARGE INSTRUCTIONS
Internal medicine    Follow ups  Please follow up with your primary care physician ( Olga Layton) within 10 days of discharge from hospital. Please call as soon as possible to make an appointment. Please contact the internal medicine clinic for an appointment if you are unable to get an appointment with your PCP. Dr. Calvin Vásquez Appointment (PCP): 1/12/23 @1:30PM  Please keep all other follow up appointments:  Please ensure to follow-up with Pulmonology for COPD evaluation    Changes in healthcare   Please take all medications as indicated  Diet: diabetic diet   Activity: activity as tolerated  New Medications started during this hospital stay  Folic acid 1 mg daily  Thiamine 100 mg daily  Nicoderm patch  NB - Please ensure to use your oxygen as instructed while avoiding smoking  Changes to your medications  None  Even if you are feeling better and not having symptoms do not stop taking antibiotic earlier then prescribed   Please contact us if you have any concerns, wish to change or make an appointment:  Internal medicine clinic   Phone: [phone]  Fax: [phone]  [address]  Should you have further questions in regards to this visit, you can review your clinical note and after visit summary document on your [employer] account. Other than any new prescriptions given to you today, the list of home medications on this After Visit Summary are based on information provided to us from you and your healthcare providers. This information, including the list, dose, and frequency of medications is only as accurate as the information you provided. If you have any questions or concerns about your home medications, please contact your Primary Care Physician for further clarification.

## 2023-01-03 NOTE — PROGRESS NOTES
Pulmonary Progress Note    Admit Date: 2022                            PCP: No primary care provider on file. Principal Problem:    COPD with acute exacerbation ([facility]  Active Problems:    COPD exacerbation ([facility]  Resolved Problems:    * No resolved hospital problems. *      Subjective:  Resting in bed on 6LHF. Feeling much better today walked around room without issues. Family at bedside     Medications:   sodium chloride      dextrose      sodium chloride          doxycycline hyclate  100 mg Oral 2 times per day    cefdinir  300 mg Oral 2 times per day    pantoprazole  40 mg Oral QAM AC    insulin glargine  10 Units SubCUTAneous Nightly    insulin lispro  0-8 Units SubCUTAneous TID WC    insulin lispro  0-4 Units SubCUTAneous Nightly    sodium chloride flush  5-40 mL IntraVENous 2 times per day    Arformoterol Tartrate  15 mcg Nebulization BID    budesonide  250 mcg Nebulization BID    hydrOXYzine  25 mg IntraMUSCular Once    sodium chloride flush  5-40 mL IntraVENous 2 times per day    enoxaparin  40 mg SubCUTAneous Daily    nicotine  1 patch TransDERmal Daily    ipratropium-albuterol  1 ampule Inhalation Q4H WA    thiamine  100 mg Oral Daily    folic acid  1 mg Oral Daily       Vitals:  VITALS:  BP (!) 111/48   Pulse 82   Temp 97 °F (36.1 °C) (Temporal)   Resp 16   Ht 6' 1\" (1.854 m)   Wt 180 lb (81.6 kg)   SpO2 92%   BMI 23.75 kg/m²   24HR INTAKE/OUTPUT:  No intake or output data in the 24 hours ending 23 0903    CURRENT PULSE OXIMETRY:  SpO2: 92 %  24HR PULSE OXIMETRY RANGE:  SpO2  Av.8 %  Min: 90 %  Max: 95 %  CVP:    VENT SETTINGS:      Additional Respiratory Assessments  Heart Rate: 82  Resp: 16  SpO2: 92 %      EXAM:  General: Alert, in NAD  ENT: No discharge. Pharynx clear. membranes moist   Neck: Supple, Trachea midline. Resp: No accessory muscle use. Non-labored. Lungs diminished , coarse RLL  CV: Regular rate. Regular rhythm. No murmur . No edema. ABD: Non-tender. Non-distended. Soft, round . Normal bowel sounds. Skin: Warm and dry. M/S: No cyanosis. No joint deformity. No clubbing. Neuro: Awake. Follows commands. O x 3, MICHAEL  Psych:  calm and interactive     I/O: No intake/output data recorded. No intake/output data recorded. Results:  CBC:   Recent Labs     01/01/23  0553 01/02/23  0626 01/03/23  0535   WBC 8.5 7.4 8.1   HGB 11.6* 12.2* 11.9*   HCT 33.3* 34.3* 35.1*   MCV 90.2 87.1 91.4    144 142     BMP:   Recent Labs     01/01/23  0553 01/02/23  0626 01/03/23  0535    139 133   K 3.6 3.5 3.6    102 98   CO2 27 26 26   BUN 17 13 13   CREATININE 0.6* 0.6* 0.6*     LFT:   Recent Labs     01/01/23  0553 01/02/23  0626 01/03/23  0535   ALKPHOS 52 51 48   ALT 35 31 25   AST 34 22 24   PROT 5.9* 5.9* 6.1*   BILITOT 0.6 0.9 0.9   LABALBU 3.0* 3.0* 2.7*     PT/INR: No results for input(s): PROTIME, INR in the last 72 hours. Procalcitonin:    Latest Reference Range & Units 12/29/22 05:52   Procalcitonin 0.00 - 0.08 ng/mL 0.97 (H)   (H): Data is abnormally high   Latest Reference Range & Units 1/2/23 06:26   Procalcitonin 0.00 - 0.08 ng/mL 0.13 (H)   (H): Data is abnormally high  Cultures:   Latest Reference Range & Units 12/28/22 15:36   INFLUENZA A NOT DETECTED  DETECTED ! INFLUENZA B NOT DETECTED  NOT DETECTED   !: Data is abnormal      ABG:   Recent Labs     12/31/22  1407   PH 7.499*   PO2 63.1*   PCO2 31.6*   HCO3 24.0   BE 1.4   O2SAT 92.0       Films:  XR CHEST PORTABLE    Result Date: 12/28/2022  EXAMINATION: ONE XRAY VIEW OF THE CHEST 12/28/2022 3:52 pm COMPARISON: None. HISTORY: ORDERING SYSTEM PROVIDED HISTORY: hypoxic TECHNOLOGIST PROVIDED HISTORY: Reason for exam:->hypoxic What reading provider will be dictating this exam?->CRC FINDINGS: Bilateral lung opacities right greater than left. There is no effusion or pneumothorax. The cardiomediastinal silhouette is without acute process. The osseous structures are without acute process. Bilateral lung opacities right greater than left. CTA PULMONARY W CONTRAST    Result Date: 12/28/2022  EXAMINATION: CTA OF THE CHEST 12/28/2022 4:11 pm TECHNIQUE: CTA of the chest was performed after the administration of intravenous contrast.  Multiplanar reformatted images are provided for review. MIP images are provided for review. Automated exposure control, iterative reconstruction, and/or weight based adjustment of the mA/kV was utilized to reduce the radiation dose to as low as reasonably achievable. COMPARISON: Chest series from the same day HISTORY: ORDERING SYSTEM PROVIDED HISTORY: hypoxic, concern for pe TECHNOLOGIST PROVIDED HISTORY: Reason for exam:->hypoxic, concern for pe Decision Support Exception - unselect if not a suspected or confirmed emergency medical condition->Emergency Medical Condition (MA) What reading provider will be dictating this exam?->CRC FINDINGS: Pulmonary Arteries: Pulmonary arteries are adequately opacified for evaluation. No evidence of intraluminal filling defect to suggest pulmonary embolism. Main pulmonary artery is normal in caliber. Mediastinum: There are prominent mediastinal and hilar lymph nodes. AP window lymph node series 301, image 92 measures 14 mm in short axis. Conglomerate lymphadenopathy in the subcarinal region measures approximately 4.8 cm on series 301, image 126. Poorly defined bilateral hilar lymph nodes. Scattered atherosclerotic plaque in the thoracic aorta and branch vessels. No aneurysmal dilatation. Heart chambers are not enlarged. No pericardial effusion. No evidence of right heart strain. Normal course and appearance of the esophagus. Lungs/pleura: Airway is patent. No endobronchial lesions. Diffuse ground-glass opacities with scattered interstitial prominence. No pleural effusion or pneumothorax. Upper Abdomen: Limited images of the upper abdomen are unremarkable. Soft Tissues/Bones:  The thyroid gland and remaining soft tissue structures of the neck appear unremarkable. No concerning axillary lymph nodes. The anterior and posterior chest wall is unremarkable. 1.  Severe diffuse ground-glass opacities in both lungs with relative sparing of the bilateral lung apices. The distribution is fairly symmetric. There are scattered areas of interstitial prominence. (The imaging features are most suggestive of acute lung disease in that clinical setting. ARDS could have this appearance.) 2. Prominent mediastinal and hilar lymph nodes. 3.  No evidence of pulmonary embolism. No evidence of right heart strain. 4.  Atherosclerotic disease. 5.  Remainder of the study is as above. RECOMMENDATIONS: Recommend pulmonology consultation. Follow-up chest CT in 10-12 weeks also suggested. 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 With extensive groundglass changes  Legionella strep pneumonia viral panel otherwise negative  HIV negative  12/29 Pro-Otbin elevated  12/30 on 15 L  12/31 Tmin 96.4 on 15 L saturating 94% 7.49/31/63/24/93%    1/1/23 on 15 liters saturations 93%   Chest film decreased infiltrates   1/2/22:9L  1/3/23 6L, pct .13     Acute hypoxic respiratory failure  Influenza A positive on Tamiflu  COPD   Superimposed community-acquired pneumonia on doxycycline ,and ceftriaxone   Mediastinal adenopathy  Nicotine addiction  Lactic acidosis on admission  Thrombocytopenia history of EtOH  Elevated LFTs  DM with elevated blood sugars due to history  HLD  HTN  DVT prophylaxis with Lovenox       Plan:    On ceftriaxone and doxycycline, pct .97, repeated 1/2 .13 improved   Maintain POX >90%, down from 15 to 6L today. Continue to wean will need to be 5L with ambulating for DC  Obtain amb pox   Chest x-ray reviewed and  clearing   Saline nasal spray to bedside  S/p Tamiflu  Check alpha-1 antitrypsin  Will need repeat CT after treatment to follow-up with the mediastinal adenopathy  Continue nebs  Supportive care            Electronically signed by LEON Klein on 1/3/2023 at 9:03 AM  Seen and evaluated, and agree with above assessment and plan  Clinically better, weaning FiO2. Home O2 evaluation in a.m. Out of bed to chair, PT OT  Hope to be discharged home soon.

## 2023-01-03 NOTE — PROGRESS NOTES
[facility]  Internal Medicine Residency Program  Progress Note - House Team     Patient:  Rosario Fontanez 47 y.o. male MRN: [id number]     Date of Service: 1/3/2023     CC: Shortness of breath  Overnight events: Oxygen requirement down to 8 L HFNC. Subjective     Patient was seen and examined this morning at bedside. He reports improvement in his shortness of breath. Patient was able to walk around on oxygen. He denies chest pain, palpitations or lightheadedness. Vital signs remained stable and patient further down to 6 L HFNC during rounds. Ambulatory pulse oximetry dropped down to 85% on room air, at rest 92%. Patient needed to be placed back on 7 L of oxygen for recovery. Plan is to discharge patient on oxygen. Per pulmonology, patient will need to repeat CT after treatment to follow-up with mediastinal adenopathy. Also to check alpha 1 antitrypsin. Patient requested for later for FMLA application, will be issuing upon discharge. Objective     Physical Exam:  Vitals: BP (!) 111/48   Pulse 82   Temp 97 °F (36.1 °C) (Temporal)   Resp 16   Ht 6' 1\" (1.854 m)   Wt 180 lb (81.6 kg)   SpO2 95%   BMI 23.75 kg/m²     I & O - 24hr: No intake/output data recorded. General Appearance: alert, appears stated age, and cooperative  HEENT:  Head: Normocephalic, no lesions, without obvious abnormality. Neck: supple, symmetrical, trachea midline  Lung: clear to auscultation bilaterally  Heart: regular rate and rhythm, S1, S2 normal, no murmur, click, rub or gallop  Abdomen: soft, non-tender; bowel sounds normal; no masses,  no organomegaly  Extremities:  extremities normal, atraumatic, no cyanosis or edema  Musculokeletal: No joint swelling, no muscle tenderness. ROM normal in all joints of extremities.    Neurologic: Mental status: Alert, oriented, thought content appropriate  Subject  Pertinent Labs & Imaging Studies   stacy  CBC with Differential:    Lab Results   Component Value Date/Time    WBC 8.1 01/03/2023 05:35 AM    RBC 3.84 01/03/2023 05:35 AM    HGB 11.9 01/03/2023 05:35 AM    HCT 35.1 01/03/2023 05:35 AM     01/03/2023 05:35 AM    MCV 91.4 01/03/2023 05:35 AM    MCH 31.0 01/03/2023 05:35 AM    MCHC 33.9 01/03/2023 05:35 AM    RDW 12.2 01/03/2023 05:35 AM    METASPCT 0.9 01/01/2023 05:53 AM    LYMPHOPCT 16.2 01/03/2023 05:35 AM    MONOPCT 6.4 01/03/2023 05:35 AM    BASOPCT 0.1 01/03/2023 05:35 AM    MONOSABS 0.52 01/03/2023 05:35 AM    LYMPHSABS 1.32 01/03/2023 05:35 AM    EOSABS 0.21 01/03/2023 05:35 AM    BASOSABS 0.01 01/03/2023 05:35 AM     BMP:    Lab Results   Component Value Date/Time     01/03/2023 05:35 AM    K 3.6 01/03/2023 05:35 AM    K 3.9 12/28/2022 03:36 PM    CL 98 01/03/2023 05:35 AM    CO2 26 01/03/2023 05:35 AM    BUN 13 01/03/2023 05:35 AM    LABALBU 2.7 01/03/2023 05:35 AM    CREATININE 0.6 01/03/2023 05:35 AM    CALCIUM 8.2 01/03/2023 05:35 AM    LABGLOM >60 01/03/2023 05:35 AM    GLUCOSE 176 01/03/2023 05:35 AM       XR CHEST PORTABLE   Final Result   Persistent but perhaps slightly decreased bilateral interstitial opacities. Lung hyperinflation and coarse interstitial markings. Atherosclerotic   disease. CTA PULMONARY W CONTRAST   Final Result   1. Severe diffuse ground-glass opacities in both lungs with relative sparing   of the bilateral lung apices. The distribution is fairly symmetric. There   are scattered areas of interstitial prominence. (The imaging features are most suggestive of acute lung disease in that   clinical setting. ARDS could have this appearance.)      2. Prominent mediastinal and hilar lymph nodes. 3.  No evidence of pulmonary embolism. No evidence of right heart strain. 4.  Atherosclerotic disease. 5.  Remainder of the study is as above. RECOMMENDATIONS:   Recommend pulmonology consultation. Follow-up chest CT in 10-12 weeks also   suggested.          XR CHEST PORTABLE   Final Result   Bilateral lung opacities right greater than left.               Resident's Assessment and Plan     Assessment and Plan:    Acute hypoxic respiratory failure 2/2 influenza with superimposed CAP  CTA showed severe bilateral diffuse groundglass opacities, follow alpha 1 antitrypsin  Respiratory panel and Legionella negative  S/p completed course of Tamiflu  SOB significantly improved since admission  Chest x-ray showed improving infiltrates  Ambulatory pulse oximetry showed significant oxygen decline on room air, 85%  Continue breathing treatments and other supportive care  Pulmonology following, inputs appreciated  Continue to wean oxygen as tolerated, will need to wean to 5 L with ambulation for discharge   Maintain oxygen sat> 90%  Patient will require repeat CT after treatment to follow-up with mediastinal adenopathy    History of hypertension  BP currently stable of antihypertensive  Consider resuming during hospital follow-up visit    History of hyperlipidemia  Currently not on statins  Most sent lipid panel 12/22,   Dietary modifications encouraged    Transaminitis in the setting of alcohol use, resolved    HAGMA 2/2 lactic acidosis, resolved    Non-insulin-dependent DM  On metformin and glipizide at home, not compliant  Continue POCT glucose AC/HS  Continue MDSSI  Continue hypoglycemic protocol    History of tobacco use  Smokes 1-1.5 PPD for 30 years  Continue nicotine patch  Will need a CT reassessment after influenza infection resolves to evaluate mediastinal lymphadenopathy    History of alcohol abuse  Drinks 2-3 beers daily  No withdrawal symptoms noted  Encourage cessation      PT/OT evaluation: AM PAC 20/24  DVT prophylaxis/ GI prophylaxis: Lovenox/Protonix  Disposition: Awaiting home health care acceptance    Sherly Oliveira MD, PGY-1  Attending physician: Dr. Rosie Kim

## 2023-01-03 NOTE — PROGRESS NOTES
Patients oxygen on room air at rest was 92%, with ambulation on room air he dropped down to 85%. Patient needed to be placed back on 7 liters of oxygen for recovery.

## 2023-01-03 NOTE — DISCHARGE INSTR - COC
Continuity of Care Form    Patient Name: Jared Wright   :  1970  MRN:  [id number]    Admit date:  2022  Discharge date:  ***    Code Status Order: Full Code   Advance Directives:     Admitting Physician:  Mookie Topete Jr., DO  PCP: No primary care provider on file.    Discharging Nurse: ***  Discharging Hospital Unit/Room#: 8513/8513-A  Discharging Unit Phone Number: ***    Emergency Contact:   Extended Emergency Contact Information  Primary Emergency Contact: Shayy Wright  Mobile Phone: [phone]  Relation: Child  Preferred language: English   needed? No    Past Surgical History:  Past Surgical History:   Procedure Laterality Date    APPENDECTOMY      CLEFT PALATE REPAIR      COLONOSCOPY      FRACTURE SURGERY      HERNIA REPAIR      TONSILLECTOMY         Immunization History:     There is no immunization history on file for this patient.    Active Problems:  Patient Active Problem List   Diagnosis Code    Influenza A J10.1    Acute respiratory failure with hypoxia (HCC) J96.01       Isolation/Infection:   Isolation            Droplet          Patient Infection Status       Infection Onset Added Last Indicated Last Indicated By Review Planned Expiration Resolved Resolved By    Influenza 22 COVID-19 & Influenza Combo 23      Resolved    COVID-19 (Rule Out) 22 Respiratory Panel, Molecular, with COVID-19 (Restricted: peds pts or suitable admitted adults) (Ordered)   22 Rule-Out Test Resulted    COVID-19 (Rule Out) 22 COVID-19 & Influenza Combo (Ordered)   22 Rule-Out Test Resulted            Nurse Assessment:  Last Vital Signs: BP (!) 111/48   Pulse 82   Temp 97 °F (36.1 °C) (Temporal)   Resp 16   Ht 6' 1\" (1.854 m)   Wt 180 lb (81.6 kg)   SpO2 92%   BMI 23.75 kg/m²     Last documented pain score (0-10 scale): Pain Level: 0  Last Weight:   Wt Readings from Last 1 Encounters:  22 180 lb (81.6 kg)     Mental Status:  {IP PT MENTAL STATUS:58583}    IV Access:  { NIURKA IV ACCESS:576117599}    Nursing Mobility/ADLs:  Walking   {CHP DME HLIN:946535772}  Transfer  {CHP DME IMRF:584325634}  Bathing  {CHP DME JGLY:863799386}  Dressing  {CHP DME BJEL:738165028}  Toileting  {CHP DME TCWQ:268346777}  Feeding  {CHP DME FWRK:302576103}  Med Admin  {CHP DME QSAK:485125330}  Med Delivery   { NIURKA MED Delivery:662160475}    Wound Care Documentation and Therapy:        Elimination:  Continence: Bowel: {YES / TS:66686}  Bladder: {YES / SW:68020}  Urinary Catheter: {Urinary Catheter:775898218}   Colostomy/Ileostomy/Ileal Conduit: {YES / MZ:13319}       Date of Last BM: ***  No intake or output data in the 24 hours ending 23 1532  No intake/output data recorded.     Safety Concerns:     [employer] Safety Concerns:747159764}    Impairments/Disabilities:      [employer] Impairments/Disabilities:924594956}    Nutrition Therapy:  Current Nutrition Therapy:   [employer] Diet List:506065990}    Routes of Feeding: {P DME Other Feedings:208596590}  Liquids: {Slp liquid thickness:09228}  Daily Fluid Restriction: {CHP DME Yes amt example:169233015}  Last Modified Barium Swallow with Video (Video Swallowing Test): {Done Not Done HXGP:792683916}    Treatments at the Time of Hospital Discharge:   Respiratory Treatments: ***  Oxygen Therapy:  {Therapy; copd oxygen:79612}  Ventilator:    {[facility] Vent AJDJ:385170339}    Rehab Therapies: {THERAPEUTIC INTERVENTION:6794055018}  Weight Bearing Status/Restrictions: [address] [employer] Weight Bearin}  Other Medical Equipment (for information only, NOT a DME order):  {EQUIPMENT:237006370}  Other Treatments: ***    Patient's personal belongings (please select all that are sent with patient):  {[facility] DME Belongings:789325822}    RN SIGNATURE:  {Esignature:986016713}    CASE MANAGEMENT/SOCIAL WORK SECTION    Inpatient Status Date: ***    Readmission Risk Assessment Score:  Readmission Risk              Risk of Unplanned Readmission:  10           Discharging to Facility/ Agency   Name:   Address:  Phone:  Fax:    Dialysis Facility (if applicable)   Name:  Address:  Dialysis Schedule:  Phone:  Fax:    / signature: {Esignature:083765999}    PHYSICIAN SECTION    Prognosis: {Prognosis:4668176171}    Condition at Discharge: Low Carrera Patient Condition:671381979}    Rehab Potential (if transferring to Rehab): {Prognosis:1584936224}    Recommended Labs or Other Treatments After Discharge: ***    Physician Certification: I certify the above information and transfer of Fronie Reason  is necessary for the continuing treatment of the diagnosis listed and that he requires {Admit to Appropriate Level of Care:47279} for {GREATER/LESS:249935536} 30 days.      Update Admission H&P: {CHP DME Changes in ALGPW:897820945}    PHYSICIAN SIGNATURE:  {Esignature:898367783}

## 2023-01-03 NOTE — CARE COORDINATION
Here for SOB. Pulmonology consult. Currently on 6 liters high low oxygen. Met with patient who states he has no preference for DME or hhc company whomever takes his insurance. Call placed [facility]- explained attending here would be willing to sign for [facility] until he has established attending. faxed demo, h&p and pulmonary note to [phone]. Await acceptance. Call To [facility] to schedule PCP appointment- 1/12/23 @ 1330  with Dr Oneil Marrero [phone]  ph [phone]. Patient updated. Electronically signed by Denita Hunter RN on 1/3/2023 at 12:23 PM    Referral to [facility] for oxygen await determination. Electronically signed by Denita Hunter RN on 1/3/2023 at 12:35 PM    Message back from [facility] not in network with insurance. Referral to [facility].await determination. Electronically signed by Denita Hunter RN on 1/3/2023 at 1:03 PM    [facility] unable to accept not in network. Electronically signed by Denita Hunter RN on 1/3/2023 at 3:29 PM    Call place to [facility] to see [facility] that is in network ph [phone]. On hold no one  answered. Electronically signed by Denita Hunter RN on 1/3/2023 at 3:38 PM

## 2023-01-04 LAB
ALPHA-1 ANTITRYPSIN: 256 MG/DL (ref 90–200)
ALPHA-1 ANTITRYPSIN: 256 MG/DL (ref 90–200)
METER GLUCOSE: 184 MG/DL (ref 74–99)
METER GLUCOSE: 184 MG/DL (ref 74–99)
METER GLUCOSE: 236 MG/DL (ref 74–99)
METER GLUCOSE: 236 MG/DL (ref 74–99)
METER GLUCOSE: 304 MG/DL (ref 74–99)
METER GLUCOSE: 304 MG/DL (ref 74–99)

## 2023-01-04 PROCEDURE — 6370000000 HC RX 637 (ALT 250 FOR IP)

## 2023-01-04 PROCEDURE — 6360000002 HC RX W HCPCS: Performed by: INTERNAL MEDICINE

## 2023-01-04 PROCEDURE — 94660 CPAP INITIATION&MGMT: CPT

## 2023-01-04 PROCEDURE — 82962 GLUCOSE BLOOD TEST: CPT

## 2023-01-04 PROCEDURE — 2700000000 HC OXYGEN THERAPY PER DAY

## 2023-01-04 PROCEDURE — 2060000000 HC ICU INTERMEDIATE R&B

## 2023-01-04 PROCEDURE — 94640 AIRWAY INHALATION TREATMENT: CPT

## 2023-01-04 PROCEDURE — 6370000000 HC RX 637 (ALT 250 FOR IP): Performed by: INTERNAL MEDICINE

## 2023-01-04 PROCEDURE — 99231 SBSQ HOSP IP/OBS SF/LOW 25: CPT | Performed by: INTERNAL MEDICINE

## 2023-01-04 PROCEDURE — 2580000003 HC RX 258: Performed by: INTERNAL MEDICINE

## 2023-01-04 RX ORDER — LANOLIN ALCOHOL/MO/W.PET/CERES
100 CREAM (GRAM) TOPICAL DAILY
Qty: 30 TABLET | Refills: 1 | Status: SHIPPED | OUTPATIENT
Start: 2023-01-05

## 2023-01-04 RX ORDER — ALBUTEROL SULFATE 90 UG/1
2 AEROSOL, METERED RESPIRATORY (INHALATION) EVERY 4 HOURS PRN
Qty: 18 G | Refills: 1 | Status: SHIPPED | OUTPATIENT
Start: 2023-01-04

## 2023-01-04 RX ORDER — FOLIC ACID 1 MG/1
1 TABLET ORAL DAILY
Qty: 30 TABLET | Refills: 1 | Status: SHIPPED | OUTPATIENT
Start: 2023-01-05

## 2023-01-04 RX ORDER — DOXYCYCLINE HYCLATE 100 MG/1
100 CAPSULE ORAL EVERY 12 HOURS SCHEDULED
Qty: 1 CAPSULE | Refills: 0 | Status: SHIPPED | OUTPATIENT
Start: 2023-01-04 | End: 2023-01-05 | Stop reason: HOSPADM

## 2023-01-04 RX ORDER — METFORMIN HYDROCHLORIDE 500 MG/1
500 TABLET, EXTENDED RELEASE ORAL
Qty: 30 TABLET | Refills: 0 | Status: SHIPPED | OUTPATIENT
Start: 2023-01-04

## 2023-01-04 RX ORDER — MECOBALAMIN 5000 MCG
5 TABLET,DISINTEGRATING ORAL NIGHTLY
Status: DISCONTINUED | OUTPATIENT
Start: 2023-01-04 | End: 2023-01-05 | Stop reason: HOSPADM

## 2023-01-04 RX ORDER — NICOTINE 21 MG/24HR
1 PATCH, TRANSDERMAL 24 HOURS TRANSDERMAL DAILY
Qty: 30 PATCH | Refills: 3 | Status: SHIPPED | OUTPATIENT
Start: 2023-01-05 | End: 2023-01-05 | Stop reason: HOSPADM

## 2023-01-04 RX ORDER — CEFDINIR 300 MG/1
300 CAPSULE ORAL EVERY 12 HOURS SCHEDULED
Qty: 1 CAPSULE | Refills: 0 | Status: SHIPPED | OUTPATIENT
Start: 2023-01-04 | End: 2023-01-05 | Stop reason: HOSPADM

## 2023-01-04 RX ORDER — DIPHENHYDRAMINE HCL 25 MG
50 TABLET ORAL EVERY 6 HOURS PRN
Status: DISCONTINUED | OUTPATIENT
Start: 2023-01-04 | End: 2023-01-05 | Stop reason: HOSPADM

## 2023-01-04 RX ADMIN — ARFORMOTEROL TARTRATE 15 MCG: 15 SOLUTION RESPIRATORY (INHALATION) at 09:53

## 2023-01-04 RX ADMIN — BUDESONIDE 250 MCG: 0.25 INHALANT ORAL at 09:54

## 2023-01-04 RX ADMIN — DIPHENHYDRAMINE HYDROCHLORIDE 50 MG: 25 TABLET ORAL at 18:04

## 2023-01-04 RX ADMIN — CEFDINIR 300 MG: 300 CAPSULE ORAL at 08:07

## 2023-01-04 RX ADMIN — INSULIN LISPRO 6 UNITS: 100 INJECTION, SOLUTION INTRAVENOUS; SUBCUTANEOUS at 18:05

## 2023-01-04 RX ADMIN — CEFDINIR 300 MG: 300 CAPSULE ORAL at 18:00

## 2023-01-04 RX ADMIN — Medication 100 MG: at 08:05

## 2023-01-04 RX ADMIN — IPRATROPIUM BROMIDE AND ALBUTEROL SULFATE 1 AMPULE: .5; 2.5 SOLUTION RESPIRATORY (INHALATION) at 09:55

## 2023-01-04 RX ADMIN — Medication 5 MG: at 18:18

## 2023-01-04 RX ADMIN — DOXYCYCLINE HYCLATE 100 MG: 100 CAPSULE ORAL at 18:00

## 2023-01-04 RX ADMIN — INSULIN LISPRO 2 UNITS: 100 INJECTION, SOLUTION INTRAVENOUS; SUBCUTANEOUS at 08:10

## 2023-01-04 RX ADMIN — SODIUM CHLORIDE, PRESERVATIVE FREE 10 ML: 5 INJECTION INTRAVENOUS at 08:09

## 2023-01-04 RX ADMIN — IPRATROPIUM BROMIDE AND ALBUTEROL SULFATE 1 AMPULE: .5; 2.5 SOLUTION RESPIRATORY (INHALATION) at 13:46

## 2023-01-04 RX ADMIN — PANTOPRAZOLE SODIUM 40 MG: 40 TABLET, DELAYED RELEASE ORAL at 06:10

## 2023-01-04 RX ADMIN — SALINE NASAL SPRAY 1 SPRAY: 1.5 SOLUTION NASAL at 08:06

## 2023-01-04 RX ADMIN — DOXYCYCLINE HYCLATE 100 MG: 100 CAPSULE ORAL at 08:06

## 2023-01-04 RX ADMIN — Medication 1 MG: at 08:06

## 2023-01-04 ASSESSMENT — PAIN SCALES - GENERAL
PAINLEVEL_OUTOF10: 0

## 2023-01-04 NOTE — PROGRESS NOTES
Comprehensive Nutrition Assessment    Type and Reason for Visit:  Initial, RD Nutrition Re-Screen/LOS    Nutrition Recommendations/Plan:   Continue current diet  Start glucerna BID to promote adequate oral intake  Will monitor     Malnutrition Assessment:  Malnutrition Status: At risk for malnutrition (Comment) (01/04/23 1153)    Context:  Social/Environmental Circumstances     Findings of the 6 clinical characteristics of malnutrition:  Energy Intake:  Less than 75% estimated energy requirements for 3 months or longer  Weight Loss:  Unable to assess (d/t lack of wt hx per EMR)     Body Fat Loss:  Unable to assess (droplet ISO)     Muscle Mass Loss:  Unable to assess (droplet ISO)    Fluid Accumulation:  No significant fluid accumulation     Strength:  Not Performed    Nutrition Assessment:    pt adm d/t resp distress/influenza A; PMhx of COPD, ETOH abuse, DM; subjective wt loss noted however lack of wt hx to confirm this via EMR review; will start ONS to aid in oral intake and monitor. Nutrition Related Findings:    A&Ox4; active BS; no edema; I/O WNL Wound Type: None       Current Nutrition Intake & Therapies:    Average Meal Intake: 26-50%  Average Supplements Intake: None Ordered  ADULT DIET; Regular; 4 carb choices (60 gm/meal)  ADULT ORAL NUTRITION SUPPLEMENT; Breakfast, Lunch; Diabetic Oral Supplement    Anthropometric Measures:  Height: 6' 1\" (185.4 cm)  Ideal Body Weight (IBW): 184 lbs (84 kg)       Current Body Weight: 180 lb (81.6 kg) (12/28-stated), 97.8 % IBW. Current BMI (kg/m2): 23.8  Usual Body Weight:  (UTO d/t lack of wt hx per EMR)     Weight Adjustment For: No Adjustment                 BMI Categories: Normal Weight (BMI 18.5-24. 9)    Estimated Daily Nutrient Needs:  Energy Requirements Based On: Formula  Weight Used for Energy Requirements: Current  Energy (kcal/day): 7066-3434  Weight Used for Protein Requirements: Current  Protein (g/day): 1.2-1.4g/gsjZOE=516-083i  Method Used for Fluid Requirements: 1 ml/kcal  Fluid (ml/day): 2000-2200    Nutrition Diagnosis:   Inadequate oral intake related to psychological cause or life stress (hx ETOH abuse) as evidenced by poor intake prior to admission, intake 26-50%    Nutrition Interventions:   Food and/or Nutrient Delivery: Continue Current Diet, Start Oral Nutrition Supplement (Glucerna BID)  Nutrition Education/Counseling: Education not indicated  Coordination of Nutrition Care: Continue to monitor while inpatient       Goals:     Goals: PO intake 75% or greater, by next RD assessment       Nutrition Monitoring and Evaluation:   Behavioral-Environmental Outcomes: None Identified  Food/Nutrient Intake Outcomes: Food and Nutrient Intake, Supplement Intake  Physical Signs/Symptoms Outcomes: Nutrition Focused Physical Findings, Biochemical Data, Chewing or Swallowing, Skin, Weight, GI Status, Fluid Status or Edema    Discharge Planning:     Too soon to determine     Freedom Tobar RD  Contact: [phone]

## 2023-01-04 NOTE — PROGRESS NOTES
Pulmonary Progress Note    Admit Date: 2022                            PCP: No primary care provider on file. Active Problems:    Influenza A    Acute respiratory failure with hypoxia (HCC)  Resolved Problems:    * No resolved hospital problems. *      Subjective:  Resting in bed on RA, just got back from bathroom. Spot check POX 87% placed on 2L. Irma Bedoya Feeling much better ready to go home      Medications:   sodium chloride      dextrose      sodium chloride          doxycycline hyclate  100 mg Oral 2 times per day    cefdinir  300 mg Oral 2 times per day    pantoprazole  40 mg Oral QAM AC    insulin glargine  10 Units SubCUTAneous Nightly    insulin lispro  0-8 Units SubCUTAneous TID WC    insulin lispro  0-4 Units SubCUTAneous Nightly    sodium chloride flush  5-40 mL IntraVENous 2 times per day    Arformoterol Tartrate  15 mcg Nebulization BID    budesonide  250 mcg Nebulization BID    hydrOXYzine  25 mg IntraMUSCular Once    sodium chloride flush  5-40 mL IntraVENous 2 times per day    enoxaparin  40 mg SubCUTAneous Daily    nicotine  1 patch TransDERmal Daily    ipratropium-albuterol  1 ampule Inhalation Q4H WA    thiamine  100 mg Oral Daily    folic acid  1 mg Oral Daily       Vitals:  VITALS:  /65   Pulse 85   Temp 97.6 °F (36.4 °C) (Temporal)   Resp 18   Ht 6' 1\" (1.854 m)   Wt 180 lb (81.6 kg)   SpO2 95%   BMI 23.75 kg/m²   24HR INTAKE/OUTPUT:    Intake/Output Summary (Last 24 hours) at 2023 0909  Last data filed at 1/3/2023 2115  Gross per 24 hour   Intake 10 ml   Output --   Net 10 ml       CURRENT PULSE OXIMETRY:  SpO2: 95 %  24HR PULSE OXIMETRY RANGE:  SpO2  Av.7 %  Min: 94 %  Max: 95 %  CVP:    VENT SETTINGS:      Additional Respiratory Assessments  Heart Rate: 85  Resp: 18  SpO2: 95 %      EXAM:  General: Alert, in NAD  ENT: No discharge. Pharynx clear. membranes moist   Neck: Supple, Trachea midline. Resp: No accessory muscle use. Non-labored.   Lungs diminished , coarse RLL  CV: Regular rate. Regular rhythm. No murmur . No edema. ABD: Non-tender. Non-distended. Soft, round . Normal bowel sounds. Skin: Warm and dry. M/S: No cyanosis. No joint deformity. No clubbing. Neuro: Awake. Follows commands. O x 3, MICHAEL  Psych:  calm and interactive     I/O: I/O last 3 completed shifts: In: 10 [I.V.:10]  Out: -   No intake/output data recorded. Results:  CBC:   Recent Labs     01/02/23 0626 01/03/23  0535   WBC 7.4 8.1   HGB 12.2* 11.9*   HCT 34.3* 35.1*   MCV 87.1 91.4    142     BMP:   Recent Labs     01/02/23 0626 01/03/23  0535    133   K 3.5 3.6    98   CO2 26 26   BUN 13 13   CREATININE 0.6* 0.6*     LFT:   Recent Labs     01/02/23 0626 01/03/23  0535   ALKPHOS 51 48   ALT 31 25   AST 22 24   PROT 5.9* 6.1*   BILITOT 0.9 0.9   LABALBU 3.0* 2.7*     PT/INR: No results for input(s): PROTIME, INR in the last 72 hours. Procalcitonin:    Latest Reference Range & Units 12/29/22 05:52   Procalcitonin 0.00 - 0.08 ng/mL 0.97 (H)   (H): Data is abnormally high   Latest Reference Range & Units 1/2/23 06:26   Procalcitonin 0.00 - 0.08 ng/mL 0.13 (H)   (H): Data is abnormally high  Cultures:   Latest Reference Range & Units 12/28/22 15:36   INFLUENZA A NOT DETECTED  DETECTED ! INFLUENZA B NOT DETECTED  NOT DETECTED   !: Data is abnormal      ABG:   No results for input(s): PH, PO2, PCO2, HCO3, BE, O2SAT in the last 72 hours. Films:  XR CHEST PORTABLE    Result Date: 12/28/2022  EXAMINATION: ONE XRAY VIEW OF THE CHEST 12/28/2022 3:52 pm COMPARISON: None. HISTORY: ORDERING SYSTEM PROVIDED HISTORY: hypoxic TECHNOLOGIST PROVIDED HISTORY: Reason for exam:->hypoxic What reading provider will be dictating this exam?->CRC FINDINGS: Bilateral lung opacities right greater than left. There is no effusion or pneumothorax. The cardiomediastinal silhouette is without acute process. The osseous structures are without acute process.      Bilateral lung opacities right greater than left. CTA PULMONARY W CONTRAST    Result Date: 12/28/2022  EXAMINATION: CTA OF THE CHEST 12/28/2022 4:11 pm TECHNIQUE: CTA of the chest was performed after the administration of intravenous contrast.  Multiplanar reformatted images are provided for review. MIP images are provided for review. Automated exposure control, iterative reconstruction, and/or weight based adjustment of the mA/kV was utilized to reduce the radiation dose to as low as reasonably achievable. COMPARISON: Chest series from the same day HISTORY: ORDERING SYSTEM PROVIDED HISTORY: hypoxic, concern for pe TECHNOLOGIST PROVIDED HISTORY: Reason for exam:->hypoxic, concern for pe Decision Support Exception - unselect if not a suspected or confirmed emergency medical condition->Emergency Medical Condition (MA) What reading provider will be dictating this exam?->CRC FINDINGS: Pulmonary Arteries: Pulmonary arteries are adequately opacified for evaluation. No evidence of intraluminal filling defect to suggest pulmonary embolism. Main pulmonary artery is normal in caliber. Mediastinum: There are prominent mediastinal and hilar lymph nodes. AP window lymph node series 301, image 92 measures 14 mm in short axis. Conglomerate lymphadenopathy in the subcarinal region measures approximately 4.8 cm on series 301, image 126. Poorly defined bilateral hilar lymph nodes. Scattered atherosclerotic plaque in the thoracic aorta and branch vessels. No aneurysmal dilatation. Heart chambers are not enlarged. No pericardial effusion. No evidence of right heart strain. Normal course and appearance of the esophagus. Lungs/pleura: Airway is patent. No endobronchial lesions. Diffuse ground-glass opacities with scattered interstitial prominence. No pleural effusion or pneumothorax. Upper Abdomen: Limited images of the upper abdomen are unremarkable. Soft Tissues/Bones:  The thyroid gland and remaining soft tissue structures of the neck appear unremarkable. No concerning axillary lymph nodes. The anterior and posterior chest wall is unremarkable. 1.  Severe diffuse ground-glass opacities in both lungs with relative sparing of the bilateral lung apices. The distribution is fairly symmetric. There are scattered areas of interstitial prominence. (The imaging features are most suggestive of acute lung disease in that clinical setting. ARDS could have this appearance.) 2. Prominent mediastinal and hilar lymph nodes. 3.  No evidence of pulmonary embolism. No evidence of right heart strain. 4.  Atherosclerotic disease. 5.  Remainder of the study is as above. RECOMMENDATIONS: Recommend pulmonology consultation. Follow-up chest CT in 10-12 weeks also suggested. 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 With extensive groundglass changes  Legionella strep pneumonia viral panel otherwise negative  HIV negative  12/29 Pro-Tobin elevated  12/30 on 15 L  12/31 Tmin 96.4 on 15 L saturating 94% 7.49/31/63/24/93%    1/1/23 on 15 liters saturations 93%   Chest film decreased infiltrates   1/2/22:9L  1/3/23 6L, pct .13  1/4 87% on RA, 2L     Acute hypoxic respiratory failure  Influenza A positive s/p  Tamiflu  COPD   Superimposed community-acquired pneumonia on doxycycline ,and ceftriaxone   Mediastinal adenopathy  Nicotine addiction  Lactic acidosis on admission  Thrombocytopenia history of EtOH  Elevated LFTs  DM with elevated blood sugars due to history  HLD  HTN  DVT prophylaxis with Lovenox       Plan:    On ceftriaxone and doxycycline to complete 7 days , pct .97, repeated 1/2 .13 improved   Maintain POX >90%, down from 15 to 2L today. Continue to wean will need to be 5L with ambulating for DC  Obtained amb pox 1/3 stats he required 7 liters to recover-repeat this   Chest x-ray reviewed 1/2 and  clearing   Saline nasal spray to bedside  S/p Tamiflu  Check alpha-1 antitrypsin  Will need repeat CT after treatment to follow-up with the mediastinal adenopathy in 3 months   Continue nebs  Supportive care            Electronically signed by LEON Vergara on 1/4/2023 at 9:09 AM    Seen and evaluated, and agree with above assessment and plan. Feeling better and eager to go home. Will repeat home O2 ambulation today if remains less than 6 L should be able to be discharged home and follow up closely as an outpatient 2 to 4 weeks  Discussed with housestaff.

## 2023-01-04 NOTE — CARE COORDINATION
Message yesterday from [facility]- they can provide oxygen -will need amb pulse ox and oxygen order. Called insurance yesterday afternoon and I had to call [employer] [phone] for providers for [facility]. Call placed to [facility]  [phone] spoke with More faxed info this am - await determinaton. Per prior cm note IM Drs are willing to sign until he gets established with Dr Clari Tan on 1/12/23. Electronically signed by Angel Miner RN on 1/4/2023 at 6:58 AM    Call placed to [facility] HEART @ [facility] unable to accomodates his needs at this time. Electronically signed by Angel Miner RN on 1/4/2023 at 10:48 AM    [facility], and [email or web address] not in network. Call placed again to [facility]  and spoke with Darinel Tarango case# [id number] faxed info to [phone] for [facility] company. Electronically signed by Angel Miner RN on 1/4/2023 at 11:16 AM    Patient updated with above and also attending ntfd. .Electronically signed by Angel Miner RN on 1/4/2023 at 12:25 PM    Erick Ramírez at [address] [facility] of oxygen order. Electronically signed by Angel Miner RN on 1/4/2023 at 12:51 PM    Call placed to insurance    opt [phone] ext [phone] spoke with Bobby Steven. Arianna Samson  is working on case [id number] for [facility].Electronically signed by Angel Miner RN on 1/4/2023 at 1:46 PM    Portable oxygen tank in room. Called insurance again- spoke with Gaye MCCLURE Asked  for Care Coordinator Arianna Samson to call me. Did speak with Arianna Samson and she has 3 referrals out awaiting determinations to [facility] companies. She is aware doctors do not want to discharge him until [facility] in place. So she has escalated it. Electronically signed by Angel Miner RN on 1/4/2023 at 2:31 PM    Call back from [employer]- case has been escalated to her. She is still working on referrals for [facility]. Her contact info   opt [phone]  ext [phone].   Electronically signed by Angel Miner RN on 1/4/2023 at 3:32 PM

## 2023-01-04 NOTE — PROGRESS NOTES
[facility]  Internal Medicine Residency Program  Progress Note - House Team 2    Patient:  Ksenia Redd 47 y.o. male MRN: [id number]     Date of Service: 1/4/2023     CC: Shortness of breath  Overnight events: Oxygen requirement down to 4-5 L     Subjective     The patient this morning is doing well. His oxygen requirement reduced further to 4L. He denies any shortness of breath, cough, chest pain, fevers or chills. He is getting out of bed without any problem, and is not dizzy. He does require oxygen while he ambulates. He feels ready to go home. He is seen later in the day during rounds with his girlfriend in the room, who he will be staying with post-discharge. Objective     Physical Exam:  Vitals: /65   Pulse 85   Temp 97.6 °F (36.4 °C) (Temporal)   Resp 18   Ht 6' 1\" (1.854 m)   Wt 180 lb (81.6 kg)   SpO2 95%   BMI 23.75 kg/m²     I & O - 24hr: No intake/output data recorded. General Appearance: alert, appears stated age, and cooperative, wearing glasses; wearing oxygen, does not appear to be in any kind of distress  HEENT:  Head: Normocephalic, no lesions, without obvious abnormality. Neck: no adenopathy, no carotid bruit, and supple, symmetrical, trachea midline  Lung: clear to auscultation bilaterally, breath sounds grossly reduced at the bases and mid lung, better at the apices  Heart: regular rate and rhythm, S1, S2 normal, no murmur, click, rub or gallop  Abdomen: soft, non-tender; bowel sounds normal; no masses,  no organomegaly  Extremities:  extremities normal, atraumatic, no cyanosis or edema  Musculokeletal: No joint swelling, no muscle tenderness. ROM normal in all joints of extremities.    Neurologic: Mental status: Alert, oriented, thought content appropriate  Subject  Pertinent Labs & Imaging Studies   stacy  CBC with Differential:    Lab Results   Component Value Date/Time    WBC 8.1 01/03/2023 05:35 AM    RBC 3.84 01/03/2023 05:35 AM    HGB 11.9 01/03/2023 05:35 AM    HCT 35.1 01/03/2023 05:35 AM     01/03/2023 05:35 AM    MCV 91.4 01/03/2023 05:35 AM    MCH 31.0 01/03/2023 05:35 AM    MCHC 33.9 01/03/2023 05:35 AM    RDW 12.2 01/03/2023 05:35 AM    METASPCT 0.9 01/01/2023 05:53 AM    LYMPHOPCT 16.2 01/03/2023 05:35 AM    MONOPCT 6.4 01/03/2023 05:35 AM    BASOPCT 0.1 01/03/2023 05:35 AM    MONOSABS 0.52 01/03/2023 05:35 AM    LYMPHSABS 1.32 01/03/2023 05:35 AM    EOSABS 0.21 01/03/2023 05:35 AM    BASOSABS 0.01 01/03/2023 05:35 AM     CMP:    Lab Results   Component Value Date/Time     01/03/2023 05:35 AM    K 3.6 01/03/2023 05:35 AM    K 3.9 12/28/2022 03:36 PM    CL 98 01/03/2023 05:35 AM    CO2 26 01/03/2023 05:35 AM    BUN 13 01/03/2023 05:35 AM    CREATININE 0.6 01/03/2023 05:35 AM    LABGLOM >60 01/03/2023 05:35 AM    GLUCOSE 176 01/03/2023 05:35 AM    PROT 6.1 01/03/2023 05:35 AM    LABALBU 2.7 01/03/2023 05:35 AM    CALCIUM 8.2 01/03/2023 05:35 AM    BILITOT 0.9 01/03/2023 05:35 AM    ALKPHOS 48 01/03/2023 05:35 AM    AST 24 01/03/2023 05:35 AM    ALT 25 01/03/2023 05:35 AM     BMP:    Lab Results   Component Value Date/Time     01/03/2023 05:35 AM    K 3.6 01/03/2023 05:35 AM    K 3.9 12/28/2022 03:36 PM    CL 98 01/03/2023 05:35 AM    CO2 26 01/03/2023 05:35 AM    BUN 13 01/03/2023 05:35 AM    LABALBU 2.7 01/03/2023 05:35 AM    CREATININE 0.6 01/03/2023 05:35 AM    CALCIUM 8.2 01/03/2023 05:35 AM    LABGLOM >60 01/03/2023 05:35 AM    GLUCOSE 176 01/03/2023 05:35 AM       Resident's Assessment and [place] is a 47 y.o. male with PMH of type 2 DM, HTN, HLD, and tobacco abuse presents with the chief complaint of SOB, nausea, vomiting. Day 8    Assessment  Acute hypoxic respiratory failure 2/2 viral pneumonia due to influenza A w/ superimposed CAP  Influenza A, + since 12/24; s/p Tamiflu  ? COPD, needs PFTs and further work-up  NIDDM, A1C: 7.8, - previously on metformin and glipizide, has not taken meds in a yr, AC/HS w/ 10 glargine, MDSS  Hx of tobacco abuse, 40-60 PPY  Hx of alcohol  abuse - drinks 2-3 beers/day x 30 years  Unintentional weight loss, 50 lbs, last colonoscopy 3 years ago     Resolved:  Transaminitis likely 2/2 alcohol use, resolved  2. HAGMA 2/2 lactic acidosis, resolved cont thiamine 495 qd & folic acid 1mg qd  3. Thrombocytopenia likely 2/2 sepsis, resolved    Plan:  Planning for discharge today; repeat ambulatory pulse ox shows 4L of oxygen requirement; DME order placed  [facility] insurance paperwork and acceptance remains pending  Last dose of Doxycycline and Cefdinir tonight; meds to  bed  Albuterol PRN added; needs further workup for COPD with PFTs, other inhalers can be prescribed after that as needed  NIDDM is fairly new, patient has not taken Metformin and Glipizide in a while. Will put him back on Metformin on discharge. The patient needs to be evaluated by PCP within the next 7 days.   Follow up with Pulmonology in office with repeat CT chest in 4 weeks      PT/OT evaluation: not required  DVT prophylaxis/ GI prophylaxis: Lovenox/ Diet  Disposition: Home with Gurinder Rucker MD, PGY-3  Attending physician: Dr. Kun Palacios

## 2023-01-04 NOTE — PROGRESS NOTES
Spo2 rest  O2 2 L  HF NC 92-93% HR 87    SpO2 rest ( sitting) O2 off  91-92% HR 89    Spo2 activity (putting on shoes) O2 Off 86-87%    Recovers quickly with O2 back @ 2 L HF NC Spo2 92-93%    Walking Spo2 86-87 O2 2L HF     Walking Spo2 89-90% 4 L HF NC     Recovers well Spo2 94-95 4LO2 HF NC    Maintains SPO2 92-93 % O2 2 L HFNC

## 2023-01-04 NOTE — PROGRESS NOTES
[facility]  Internal Medicine Residency / [address]    Attending Physician Statement  I have discussed the case, including pertinent history and exam findings with the resident and the team.  I have seen and examined the patient and the key elements of the encounter have been performed by me. I have also personally reviewed imaging studies and labs. I agree with the assessment, plan and orders as documented by the resident. Down to 4L this morning. Breath sounds remain clear. Assessment:  Acute hypoxic respiratory failure 2/2 influenza A with 2/2 bacterial infection. COPD, not in exacerbation  Mediastinal LN  HTN, controlled  DM, non-insulin requiring. Basal insulin initiated during this admission for hyperglycemia. Hx of alcohol use           Plan:  Still unable to establish with HHC - if still unable to establish inpatient tomorrow, will need to coordinate with CM and patient's new PCP if this can be done outpatient  Oxygen delivered to room  Last dose of abx tonight  Rpt CT outpatient  Outpatient work up for weight loss  Continue current insulin regimen while inpatient  Tobacco cessation        Remainder of medical problems as per resident note. José Vera MD  Internal Medicine

## 2023-01-04 NOTE — PROGRESS NOTES
CLINICAL PHARMACY NOTE: MEDS TO BEDS    Total # of Prescriptions Filled: 7   The following medications were delivered to the patient:  Cefdinir 300 mg  Doxycycline hyclate 711 mg caps  Folic acid 1 mg  Nicotine patch 21 mg  Albuterol sulfate HFA  Thiamine mononitrate 100 mg  Metformin  mg  Delivered to pt    Additional Documentation:

## 2023-01-05 VITALS
BODY MASS INDEX: 23.86 KG/M2 | HEART RATE: 76 BPM | DIASTOLIC BLOOD PRESSURE: 60 MMHG | SYSTOLIC BLOOD PRESSURE: 102 MMHG | SYSTOLIC BLOOD PRESSURE: 102 MMHG | DIASTOLIC BLOOD PRESSURE: 60 MMHG | BODY MASS INDEX: 23.86 KG/M2 | WEIGHT: 180 LBS | OXYGEN SATURATION: 95 % | TEMPERATURE: 98.1 F | HEIGHT: 73 IN | OXYGEN SATURATION: 95 % | RESPIRATION RATE: 20 BRPM | TEMPERATURE: 98.1 F | HEIGHT: 73 IN | HEART RATE: 76 BPM | RESPIRATION RATE: 20 BRPM | WEIGHT: 180 LBS

## 2023-01-05 LAB
METER GLUCOSE: 215 MG/DL (ref 74–99)
METER GLUCOSE: 215 MG/DL (ref 74–99)

## 2023-01-05 PROCEDURE — 94660 CPAP INITIATION&MGMT: CPT

## 2023-01-05 PROCEDURE — 2700000000 HC OXYGEN THERAPY PER DAY

## 2023-01-05 PROCEDURE — 6370000000 HC RX 637 (ALT 250 FOR IP)

## 2023-01-05 PROCEDURE — 99231 SBSQ HOSP IP/OBS SF/LOW 25: CPT | Performed by: INTERNAL MEDICINE

## 2023-01-05 PROCEDURE — 82962 GLUCOSE BLOOD TEST: CPT

## 2023-01-05 PROCEDURE — 6370000000 HC RX 637 (ALT 250 FOR IP): Performed by: INTERNAL MEDICINE

## 2023-01-05 RX ADMIN — Medication 100 MG: at 09:21

## 2023-01-05 RX ADMIN — PANTOPRAZOLE SODIUM 40 MG: 40 TABLET, DELAYED RELEASE ORAL at 05:43

## 2023-01-05 RX ADMIN — DOXYCYCLINE HYCLATE 100 MG: 100 CAPSULE ORAL at 09:21

## 2023-01-05 RX ADMIN — Medication 1 MG: at 09:21

## 2023-01-05 RX ADMIN — CEFDINIR 300 MG: 300 CAPSULE ORAL at 09:21

## 2023-01-05 ASSESSMENT — PAIN SCALES - GENERAL: PAINLEVEL_OUTOF10: 0

## 2023-01-05 NOTE — PROGRESS NOTES
[facility]  Internal Medicine Residency / [address]    Attending Physician Statement  I have discussed the case, including pertinent history and exam findings with the resident and the team.  I have seen and examined the patient and the key elements of the encounter have been performed by me. I have also personally reviewed imaging studies and labs. I agree with the assessment, plan and orders as documented by the resident. Assessment:  Acute hypoxic respiratory failure 2/2 influenza A with 2/2 bacterial infection, down to 4L oxygen this AM  COPD, not in exacerbation  Mediastinal LN  HTN, controlled  DM, non-insulin requiring. Basal insulin initiated during this admission for hyperglycemia. Hx of alcohol use      Plan: For discharge  Unable to establish [address] while inpatient ([facility])  Med Team reached out to Dr Laci Espinosa office - they will be able to keep following up for [address] needs. Pharmacy delivered patient meds to room yesterday including abx. Patient was instructed not to take abx anymore as he completed the dose last night. Oxygen delivered yesterday 1/4/22 to patient's room  Patient has family with medical background who will be able to check on him at   No insulin on dc - resume oral hypoglycemics  Continued tobacco cessation discussed  Needs Pulm ff up  Rpt CT chest for ff of LN outpatient  Number for patient advocated given to patient's daughter as they would like to discuss insurance issues that occurred this admission. Remainder of medical problems as per resident note. Pura Palacios MD  Internal Medicine

## 2023-01-05 NOTE — CARE COORDINATION
Call last evening from insurance care coordinator Kevin Koenig- she has one more hhc co. To call this am @ 0930- ([facility]) does not sound promising. Message to attending with above to see if can discharge today with outpt pt./ot if needed and he does have pcp appt 1/12/23 with Dr Yon Tamayo. Electronically signed by Lauretha Meigs, RN on 1/5/2023 at 7:46 AM    Attempted to explain to patient regarding hhc./insurance  He is up pacing in room. He is very agitated and threatening to sign out AMA. Message to attending. Electronically signed by Lauretha Meigs, RN on 1/5/2023 at 10:26 AM    Call back from Kevin Koenig care coordinator @ [place]. She was unable to get hhc company as he has out of pocket costs and not met his deductible- unable to disclose amt. I notified her of discharge and she will follow up with him. Electronically signed by Lauretha Meigs, RN on 1/5/2023 at 10:59 AM    call back from [facility] evacore  opt [phone] ext [phone]. She spoke with patient and he is declining hhc services and she is closing the case. Electronically signed by Lauretha Meigs, RN on 1/5/2023 at 12:04 PM

## 2023-01-05 NOTE — PLAN OF CARE
Problem: Discharge Planning  Goal: Discharge to home or other facility with appropriate resources  1/4/2023 2116 by Markel Herrera RN  Outcome: Progressing  1/4/2023 0830 by Domonique Landers RN  Outcome: Progressing  Flowsheets (Taken 1/4/2023 0800)  Discharge to home or other facility with appropriate resources: Identify barriers to discharge with patient and caregiver     Problem: Chronic Conditions and Co-morbidities  Goal: Patient's chronic conditions and co-morbidity symptoms are monitored and maintained or improved  1/4/2023 2116 by Markel Herrera RN  Outcome: Progressing  1/4/2023 0830 by Domonique Landers RN  Outcome: Progressing  Flowsheets (Taken 1/4/2023 0800)  Care Plan - Patient's Chronic Conditions and Co-Morbidity Symptoms are Monitored and Maintained or Improved: Monitor and assess patient's chronic conditions and comorbid symptoms for stability, deterioration, or improvement     Problem: Pain  Goal: Verbalizes/displays adequate comfort level or baseline comfort level  1/4/2023 2116 by Markel Herrera RN  Outcome: Progressing  1/4/2023 0830 by Domonique Landers RN  Outcome: Progressing  Flowsheets (Taken 1/4/2023 0800)  Verbalizes/displays adequate comfort level or baseline comfort level: Encourage patient to monitor pain and request assistance     Problem: Nutrition Deficit:  Goal: Optimize nutritional status  Outcome: Progressing

## 2023-01-05 NOTE — DISCHARGE SUMMARY
[address]  Discharge Summary    PCP: No primary care provider on file. Admit Date:12/28/2022  Discharge Date: 1/5/2023    Admission Diagnosis:   Acute hypoxic respiratory failure 2/2 influenza with superimposed CAP  Influenza A infection  HAGMA 2/2 lactic acidosis  Thrombocytopenia 2/2 sepsis  Transaminitis 2/2 alcohol use  Hx of NIDDM  Hx of HLD  Hx of HTN  Hx of Tobacco use    Discharge Diagnosis:  Acute hypoxic respiratory failure 2/2 influenza with superimposed CAP, improved  Influenza A infection 12/24, resolved  HAGMA 2/2 lactic acidosis, resolved  Thrombocytopenia 2/2 sepsis, resolved  Transaminitis 2/2 alcohol use, resolved  Mediastinal lymph node on CT Chest  Hx of NIDDM, on metformin 500 mg BID at home  Hx of HLD  Hx of HTN, stable  Hx of tobacco use, agreeable to quit  ? Hx of COPD, needs PFTs and further work-up as out-patient    Hospital Course:   Mr Rosie Park is a 47 y.o. male with past medical history of type 2 diabetes, hypertension, hyperlipidemia and tobacco use disorder who was admitted via the ED 12/28 for shortness of breath, nausea, vomiting and diarrhea for the past few days. Patient recently went to [address] [place] [address] 300 ED on 12/24 and was found to be influenza A positive and was discharged home. Patient stated he has not been able to eat anything since the discharge due to the nausea and vomiting. Nausea and vomiting has subsided for the past few days prior to presentation. He did mention he had a fever at home of 102.5 at its max. His daughter was present and did say that he has become increasingly more hypoxic when they check with a home pulse ox. Over the last few days he has used supplemental oxygen that his daughter gave him from the grandmother that did help but the patient did say he still had shortness of breath.   He has been short of breath for years due to his history of smoking but it is felt worse over the last few days prior to presentation. Patient did say he smokes about a pack and 1/2 to 2 packs a day for years. He also mention he has not been to a doctor or on any medication for over a year. EMS put him on CPAP and his oxygen saturation was around 70%. When he arrived to the ED he was transitioned to BiPAP and was also given IV magnesium that improved his oxygenation to greater than 90%. CTA did show severe diffuse groundglass opacities in both lungs. And patient was still positive for influenza A. Urine and blood cultures were drawn and IV antibiotics were given. Patient was then admitted to medical team for further management and work-up. In the floor, patient improved breathing treatments and antibiotics while completing 5 days course of tamiflu evidenced by his ability to be weaned down of oxygen requirement to 3L. Pulmonology was consulted who ordered alpha-1 antitrypsin check that came back unremarkable and recommended patient repeat CT after treatment to follow-up with the mediastinal adenopathy in 3 months. Patient was assessed with ambulatory pulse oximetry which showed significant oxygen decline on room air, 85% and upon repeat the next day showed 4L of oxygen requirement; DME order for home oxygen was placed and oxygen cylinder was received by patient. HHC was also ordered but acceptance remained pending during to some insurance paperwork yet to be finalized and patient expressed his frustrations and grievance regarding the bureaucracy. Today, patient was seen and examined at the bedside in no distress. He reports no new medical concerns. He refused labs draws in the morning. Patient's PCP Dr. Timothy Nation office was called by the team. Discussion of establishing [facility] as outpatient was discussed. Upon discussion, [facility] is able to be established as outpatient with Dr. Timothy Nation office at patient's appointment on 1/12/23.  Patient was cleared for discharge and discharge instructions given at the bedside with daughter present to drive him home. General Appearance: alert and no distress, appeared angry   HEENT:  Head: Normal, normocephalic, atraumatic. Neck: supple, symmetrical, trachea midline  Lung: clear to auscultation bilaterally  Heart: regular rate and rhythm, S1, S2 normal, no murmur, click, rub or gallop  Abdomen: soft, non-tender; bowel sounds normal; no masses,  no organomegaly  Extremities:  extremities normal, atraumatic, no cyanosis or edema  Musculokeletal: No joint swelling, no muscle tenderness. ROM normal in all joints of extremities.    Neurologic: Mental status: Alert, oriented, thought content appropriate    Pending test results: None    Consults:  Pulmonology    Procedures: None     Condition at discharge: Stable    Disposition: home    Discharge Medications:  Discharge Medication List as of 1/5/2023 10:34 AM        START taking these medications    Details   folic acid (FOLVITE) 1 MG tablet Take 1 tablet by mouth daily, Disp-30 tablet, R-1Normal      thiamine 100 MG tablet Take 1 tablet by mouth daily, Disp-30 tablet, R-1Normal      albuterol sulfate HFA (VENTOLIN HFA) 108 (90 Base) MCG/ACT inhaler Inhale 2 puffs into the lungs every 4 hours as needed for Wheezing (Please use it as needed for shortness of breath and wheezing; can use up to every 4 hours), Disp-18 g, R-1Normal      metFORMIN (GLUCOPHAGE-XR) 500 MG extended release tablet Take 1 tablet by mouth daily (with breakfast), Disp-30 tablet, R-0Normal           CONTINUE these medications which have NOT CHANGED    Details   ondansetron (ZOFRAN-ODT) 4 MG disintegrating tablet Take 8 mg by mouth every 8 hours as needed for Nausea or VomitingHistorical Med      acetaminophen (TYLENOL) 325 MG tablet Take 650 mg by mouth every 6 hours as needed for PainHistorical Med      ibuprofen (ADVIL;MOTRIN) 200 MG tablet Take 200 mg by mouth every 6 hours as needed for PainHistorical Med           STOP taking these medications       cefdinir (OMNICEF) 300 MG capsule Comments:   Reason for Stopping:         doxycycline hyclate (VIBRAMYCIN) 100 MG capsule Comments:   Reason for Stopping:                  naproxen (NAPROSYN) 500 MG tablet Comments:   Reason for Stopping: Follow ups  Please follow up with your primary care physician ( Laurie Ramos) within 10 days of discharge from hospital. Please call as soon as possible to make an appointment. Please contact the internal medicine clinic for an appointment if you are unable to get an appointment with your PCP. Dr. Cal Verde Appointment (PCP): 1/12/23 @1:30PM  Please keep all other follow up appointments:  Please ensure to follow-up with Pulmonology for COPD evaluation    Changes in healthcare   Please take all medications as indicated  Diet: diabetic diet   Activity: activity as tolerated  New Medications started during this hospital stay  Folic acid 1 mg daily  Thiamine 100 mg daily  Nicoderm patch  Changes to your medications  None  Please contact us if you have any concerns, wish to change or make an appointment:  Internal medicine clinic   Phone: [phone]  Fax: [phone]  [address]  Should you have further questions in regards to this visit, you can review your clinical note and after visit summary document on your [employer] account. Other than any new prescriptions given to you today, the list of home medications on this After Visit Summary are based on information provided to us from you and your healthcare providers. This information, including the list, dose, and frequency of medications is only as accurate as the information you provided. If you have any questions or concerns about your home medications, please contact your Primary Care Physician for further clarification.       Adore Fortune MD  PGY 1   11:54 AM 1/5/2023

## 2023-01-05 NOTE — PLAN OF CARE
@1000  Patient's PCP Dr. Laci Espinosa office was called. Discussion of establishing [place] [address] as outpatient was discussed.  Upon discussion, [facility] is able to be established as outpatient with Dr. Laci Espinosa office at patient's appointment on 1/12/23

## 2023-01-06 NOTE — PROGRESS NOTES
Physician Progress Note      PATIENT:               Konrad Goodwin  CSN #:                  [id number]  :                       1970  ADMIT DATE:       2022 3:09 PM  [address] DATE:        2023 11:22 AM  RESPONDING  PROVIDER #:        Rodney LOTT          QUERY TEXT:    Patient admitted with respiratory failure and influenza . Documentation   reflects Thrombocytopenia secondary to Sepsis in the H&P without further   documentation. If possible, please document in the progress notes and   discharge summary if sepsis  was: The medical record reflects the following:  Risk Factors: Influenza, Respiratory failure  Clinical Indicators: H&P Thrombocytopenia 2/2 sepsis Platelets on admission   79; Vital sign findings 100.4, 103, 26, 107/67, 99%PAP  97.3, 75, 28, 113/76,   100% PAP with 70% O2, Lab Findings WBC 5.9, 5.0, 4.7 procal 0.97 CRP 20.7   lactic 2.8  Treatment: Serial labs and Vital sign findings, omnicef, tamiflu    Thank you  Louis PANTOJAN, RN, CCDS  Clinical Documentation Improvement  Options provided:  -- Sepsis confirmed after study  -- Sepsis treated and resolved  -- Sepsis ruled out after study  -- Other - I will add my own diagnosis  -- Disagree - Not applicable / Not valid  -- Disagree - Clinically unable to determine / Unknown  -- Refer to Clinical Documentation Reviewer    PROVIDER RESPONSE TEXT:    Sepsis ruled out after study.     Query created by: Yancy Lizarraga on 3902 1:50 PM      Electronically signed by:  Rashard Alvarez 2023 1:18 PM

## 2023-01-06 NOTE — ADT AUTH CERT
Viral Illness, Acute - Care Day 7 (1/3/2023) by Lizz Villaseñor RN       Review Status Review Entered   Completed 1/6/2023 1115       Created By   Lizz Villaseñor RN      Criteria Review      Care Day: 7 Care Date: 1/3/2023 Level of Care: Intermediate Care    Guideline Day 2    Clinical Status    (X) * Hypotension absent    1/6/2023 11:15 AM EST by Taminell Leventhal      111/48    (X) * No requirement for mechanical ventilation    ( ) * Oxygenation at baseline or improved    1/6/2023 11:15 AM EST by Alayna Toney      6 L HFNC    (X) * Mental status at baseline    Activity    (X) Advance activity as tolerated    1/6/2023 11:15 AM EST by Tami Leventhal      cont same    Routes    (X) * Oral hydration    1/6/2023 11:15 AM EST by Tami Leventhal      cont same    (X) Oral or IV medications    1/6/2023 11:15 AM EST by Tami Leventhal      cont current meds plus    (X) Usual diet    1/6/2023 11:15 AM EST by Tami Leventhal      cont same    Interventions    (X) Possible Isolation    1/6/2023 11:15 AM EST by Tami Leventhal      cont same    (X) Pulse oximetry    1/6/2023 11:15 AM EST by Tami Leventhal      cont    (X) Possible oxygen    1/6/2023 11:15 AM EST by Tami Leventhal      6 L HFNC    Medications    (X) Possible antibiotic (eg, for bacterial coinfection or superinfection)    1/6/2023 11:15 AM EST by Tami Leventhal      omnicef 300 mg bikd po (started 1/2)  vibramycin 100 mg bid po (started 1/2)    (X) Possible DVT prophylaxis    1/6/2023 11:15 AM EST by Tami Leventhal      hold lovenox    * Milestone   Additional Notes   DATE: 1/3/2022         RELEVANT BASELINES: (lab values, vitals, o2 amount/delivery, etc.)   No documentation of home O2 usage      PERTINENT UPDATES:   Pt continues to need supplemental O2 @ a high flow rate to maintain SPO@ > 92%      VITALS:   97.2 18 92 111/48 92% 6 L HFNC fio2 @ 60%      ABNL/PERTINENT LABS/RADIOLOGY/DIAGNOSTIC STUDIES:      Meter Glucose: 221 (H)      Creatinine: 0.6 (L)   Glucose, Random: 176 (H)   CALCIUM, SERUM, 064333: 8.2 (L)   Total Protein: 6.1 (L)   Albumin: 2.7 (L)      Hemoglobin Quant: 11.9 (L)   Hematocrit: 35.1 (L)      Meter Glucose: 257 (H)   Meter Glucose: 227 (H)   Meter Glucose: 282 (H)      Per Pulmo (NP)   PHYSICAL EXAM:   Resp: No accessory muscle use. Non-labored. 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 ER influenza A+ fever 1-2.5      12/28 presents with shortness of breath nausea vomiting diarrhea treated with CPAP and IV steroids Tamiflu   Influenza A+   CTA showing prominent mediastinal hilar lymph nodes. With extensive groundglass changes   Legionella strep pneumonia viral panel otherwise negative   HIV negative   12/29 Pro-Tobin elevated   12/30 on 15 L   12/31 Tmin 96.4 on 15 L saturating 94% 7.49/31/63/24/93%     1/1/23 on 15 liters saturations 93%    Chest film decreased infiltrates    1/2/22:9L   1/3/23 6L, pct .13       Acute hypoxic respiratory failure   Influenza A positive on Tamiflu   COPD    Superimposed community-acquired pneumonia on doxycycline ,and ceftriaxone    Mediastinal adenopathy   Nicotine addiction   Lactic acidosis on admission   Thrombocytopenia history of EtOH   Elevated LFTs   DM with elevated blood sugars due to history   HLD   HTN   DVT prophylaxis with Lovenox         Plan: On ceftriaxone and doxycycline, pct .97, repeated 1/2 .13 improved    Maintain POX >90%, down from 15 to 6L today.  Continue to wean will need to be 5L with ambulating for DC   Obtain amb pox    Chest x-ray reviewed and  clearing    Saline nasal spray to bedside   S/p Tamiflu   Check alpha-1 antitrypsin   Will need repeat CT after treatment to follow-up with the mediastinal adenopathy   Continue nebs   Supportive care ------------------------------------------------------------------------------------------------------      Per attending   Seen and evaluated, and agree with above assessment and plan   Clinically better, weaning FiO2. Home O2 evaluation in a.m.   Out of bed to chair, PT OT   =========================================================      Per IM (resident)   Resident's Assessment and Plan       Assessment and Plan:       Acute hypoxic respiratory failure 2/2 influenza with superimposed CAP   CTA showed severe bilateral diffuse groundglass opacities, follow alpha 1 antitrypsin   Respiratory panel and Legionella negative   S/p completed course of Tamiflu   SOB significantly improved since admission   Chest x-ray showed improving infiltrates   Ambulatory pulse oximetry showed significant oxygen decline on room air, 85%   Continue breathing treatments and other supportive care   Pulmonology following, inputs appreciated   Continue to wean oxygen as tolerated, will need to wean to 5 L with ambulation for discharge    Maintain oxygen sat> 90%   Patient will require repeat CT after treatment to follow-up with mediastinal adenopathy       History of hypertension   BP currently stable of antihypertensive   Consider resuming during hospital follow-up visit       History of hyperlipidemia   Currently not on statins   Most sent lipid panel 12/22,    Dietary modifications encouraged       Transaminitis in the setting of alcohol use, resolved       HAGMA 2/2 lactic acidosis, resolved       Non-insulin-dependent DM   On metformin and glipizide at home, not compliant   Continue POCT glucose AC/HS   Continue MDSSI   Continue hypoglycemic protocol       History of tobacco use   Smokes 1-1.5 PPD for 30 years   Continue nicotine patch   Will need a CT reassessment after influenza infection resolves to evaluate mediastinal lymphadenopathy       History of alcohol abuse   Drinks 2-3 beers daily   No withdrawal symptoms noted Encourage cessation   --------------------------------------------------------------------------------------------------      Per attending   Assessment:   Acute hypoxic respiratory failure 2/2 influenza A with 2/2 bacterial infection. He has not been smoking since admission and he plans to continue to be off tobacco use after d/c. COPD, not in exacerbation   Mediastinal LN   HTN, controlled   DM, non-insulin requiring. A1c 7.8 this admission. Tolerating basal insulin however continues to have BS above goal, likely 2/2 to diet. Hx of alcohol use       Plan: Will need to set up with oxygen and HHC prior to discharge   Total of 7 days for abx   Rpt CT outpatient   Outpatient work up for weight loss   Monitor BS          MEDICATIONS:   As noted in above criteria      ORDERS:   Check Pulse Oximetry while ambulating    OT eval and treat    Out of bed to chair 3 times a day, as tolerated. Home O2 evaluation in a.m. Inpatient consult to Home Care Needs          PT/OT/SLP/CM ASSESSMENT OR NOTES:   Per RN note @ 1640   Patients oxygen on room air at rest was 92%, with ambulation on room air he dropped down to 85%. Patient needed to be placed back on 7 liters of oxygen for recovery.             Viral Illness, Acute - Care Day 3 (12/30/2022) by Wolfgang Stahl RN       Review Status Review Entered   Completed 1/6/2023 1102       Created By   Wolfgang Stahl RN      Criteria Review      Care Day: 3 Care Date: 12/30/2022 Level of Care: Intermediate Care    Guideline Day 2    Clinical Status    (X) * Hypotension absent    1/6/2023 11:02 AM EST by Sarbjit Stein      126/77    (X) * No requirement for mechanical ventilation    ( ) * Oxygenation at baseline or improved    1/6/2023 11:02 AM EST by Sarbjit Stein      pt continues on HFNC 15 L    (X) * Mental status at baseline    1/6/2023 11:02 AM EST by Alayna Reeder      x4    Activity    (X) Advance activity as tolerated    1/6/2023 11:02 AM EST by Sarbjit Stein cont strict bedrest    Routes    (X) * Oral hydration    (X) Oral or IV medications    1/6/2023 11:02 AM EST by Deon lam current meds plus  tylenol 650 mg q6 prn x1 po  melatonin 3 mg qhs prn x1 po    (X) Usual diet    1/6/2023 11:02 AM EST by Deon Gilbert      cont same    Interventions    (X) Possible Isolation    1/6/2023 11:02 AM EST by Deon Gilbert      Droplet isolation    (X) Pulse oximetry    1/6/2023 11:02 AM EST by Deon Gilbert      continuous    (X) Possible oxygen    1/6/2023 11:02 AM EST by Deon Gilbert      15 L HFNC    Medications    (X) Possible antiviral medication    (X) Possible antibiotic (eg, for bacterial coinfection or superinfection)    1/6/2023 11:02 AM EST by Deon Gilbert      cont iv rocephin and iv vibramycin    (X) Possible corticosteroid    1/6/2023 11:02 AM EST by Deon Gilbert      cont iv solumedrol    (X) Possible DVT prophylaxis    1/6/2023 11:02 AM EST by Deon Gilbert      cont lovenox    * Milestone   Additional Notes   DATE: 12/30/2022         RELEVANT BASELINES: (lab values, vitals, o2 amount/delivery, etc.)   No documentation of home O2 usage      PERTINENT UPDATES:   Pt continues to need supplemental O2 @ a high flow rate to maintain SPO@ > 92%      VITALS:   98.2 21 79 126/77 94% 15 L HFNC      ABNL/PERTINENT LABS/RADIOLOGY/DIAGNOSTIC STUDIES:      Creatinine: 0.6 (L)   Glucose, Random: 301 (H)   CALCIUM, SERUM, 167031: 8.0 (L)   Total Protein: 6.0 (L)   Albumin: 2.9 (L)      RBC: 3.73 (L)   Hemoglobin Quant: 11.6 (L)   Hematocrit: 32.5 (L)      Platelet Count: 684 (L)      Meter Glucose: 316 (H)   Meter Glucose: 322 (H)   Meter Glucose: 300 (H)   Meter Glucose: 375 (H)      Per Pulmp   PHYSICAL EXAM:   GENERAL: He is mildly diaphoretic but no acute distress   SKIN: Clammy, no rashes   LUNGS: Had scattered crackles      MD CONSULTS/ASSESSMENT AND PLAN:   IMPRESSION:   1.   Acute hypoxic respiratory failure, currently on 15 liters or BiPAP   10/5.   2.  ARDS. 3.  Influenza A. He also has an acute exacerbation of COPD probably. 4.  Mediastinal adenopathy which I suspect is reactive but will likely   need a repeat CT scan down the road. At this point, with his   uncontrolled blood sugars, I am reluctant to increase the steroids,   otherwise would just continue current therapy. He is on Tamiflu   although it is little bit late as well as community acquired pneumonia   therapy with doxycycline and ceftriaxone both of which I agree with.     The nebulizer regimen of budesonide or formoterol and DuoNeb q.4 while   awake is reasonable.   ========================================================      Per IM (resident)       Assessment and Plan:       Acute hypoxic respiratory failure 2/2 influenza A w/ superimposed CAP in the setting of likely acute COPD exacerbation   CTA showed severe b/l diffuse groundglass opacities   Breathing tx Brovana, pulmicort, duoneb, incentive spirometer     Resp panel, legionella neg,    Ceftriaxone and doxycycline for CAP coverage with methylprednisolone 40 twice daily   Follow respiratory cultures, blood cultures 12/28 NGTD, urine cultures, MRSA nares   Continue BiPAP at night and sleeping, Vistaril 25 as needed if anxious on BiPAP   Appreciate pulmonary rec, will follow with pulm outpatient   Influenza A, pos since 12/24   Tamiflu 12/28-1/2   Hx of HTN - previously on lisinopril, not taking   Hx of HLD - not taking home statins    Transaminitis likely 2/2 alcohol use, resolved - alcohol pattern   HAGMA 2/2 lactic acidosis, resolved cont thiamine 756 qd & folic acid 1mg qd   NIDDM - previously on metformin and glipizide, has not taken meds in a yr   AC/HS w/ 10 glargine, MDSS   Blood glucose rising likely due to steroids   Thrombocytopenia likely 2/2 sepsi, PBS wnl    Hx of tobacco abuse - smokes 1-1.5ppd since he was young, nicotine patch as needed   Hx of alcohol  abuse - drinks 2-3 beers per days for the last 30 yrs   Not currently on CIWA protocol, thiamine and folic acid   Monitor for signs of withdrawal   ---------------------------------------------------------------------------------------------------      Per attending       Acute Hypoxic Respiratory Failure 2/2 likely COPD exacerbation 2/2 Influenza and CAP    -improving and was able to be titrated off bipap to nasal canula    -undiagnosed COPD; does not use any inhalers at home   -tobacco abuse 1.5 ppd    -ground glass opaccities on CTA; atelectasis vs consolidation in lower lobe   -continue ceftriaxone and doxycycline, continue tamiflu   -aggressive pulmonary toilet   -bronchodilators and LABA/ICS   -BiPAP at night and during rest    -Pulmonology consult today 2/2 continued hypoxia while using 15 L NC; input appreciated        NIDDM2   -does not use insulin at home   -hyperglycemia 2/2 steroids    -starting lantus today and continue sliding scale insulin    -titrate insulin as needed        Thrombocytopenia   -likely 2/2 combination of alcohol use and infection    -blood work pending        Alcohol Use   -vitamin supplementation    -monitor for withdrawal symptoms        DVT and GI proplylaxis       MEDICATIONS:   As noted in above criteria plus      insulin lispro (HUMALOG) injection vial 0-4 Units   Dose: 0-4 Units   Freq: NIGHTLY Route: SC      ORDERS:   Inpatient consult to Pulmonology    Initiate Oxygen Therapy Protocol    Fall precautions    Seizure precautions    Alcohol and or drug assessment          PT/OT/SLP/CM ASSESSMENT OR NOTES:   Per CM   Patient will likely need Home O2, NIV and HHC at discharge. Patient will also need a new PCP set up at discharge. As this is the case, the group will follow the [facility] [place]. orders at discharge until the patient is able to establish with the new primary care provider.  Dr Afshan Cerrato will be the attending the next two weeks

## 2023-01-12 ENCOUNTER — OFFICE VISIT (OUTPATIENT)
Dept: PRIMARY CARE CLINIC | Age: 53
End: 2023-01-12
Payer: COMMERCIAL

## 2023-01-12 VITALS
HEART RATE: 81 BPM | BODY MASS INDEX: 22.04 KG/M2 | OXYGEN SATURATION: 98 % | SYSTOLIC BLOOD PRESSURE: 110 MMHG | DIASTOLIC BLOOD PRESSURE: 72 MMHG | TEMPERATURE: 97.1 F | WEIGHT: 166.3 LBS | HEIGHT: 73 IN

## 2023-01-12 DIAGNOSIS — E11.9 TYPE 2 DIABETES MELLITUS WITHOUT COMPLICATION, WITHOUT LONG-TERM CURRENT USE OF INSULIN (HCC): ICD-10-CM

## 2023-01-12 DIAGNOSIS — Z12.5 PROSTATE CANCER SCREENING: ICD-10-CM

## 2023-01-12 DIAGNOSIS — J96.01 ACUTE RESPIRATORY FAILURE WITH HYPOXIA (HCC): Primary | ICD-10-CM

## 2023-01-12 DIAGNOSIS — J44.9 CHRONIC OBSTRUCTIVE PULMONARY DISEASE, UNSPECIFIED COPD TYPE (HCC): ICD-10-CM

## 2023-01-12 PROCEDURE — 99204 OFFICE O/P NEW MOD 45 MIN: CPT | Performed by: INTERNAL MEDICINE

## 2023-01-12 RX ORDER — FLUTICASONE FUROATE, UMECLIDINIUM BROMIDE AND VILANTEROL TRIFENATATE 100; 62.5; 25 UG/1; UG/1; UG/1
1 POWDER RESPIRATORY (INHALATION) DAILY
Qty: 1 EACH | Refills: 1 | Status: SHIPPED | OUTPATIENT
Start: 2023-01-12

## 2023-01-12 SDOH — ECONOMIC STABILITY: TRANSPORTATION INSECURITY
IN THE PAST 12 MONTHS, HAS THE LACK OF TRANSPORTATION KEPT YOU FROM MEDICAL APPOINTMENTS OR FROM GETTING MEDICATIONS?: NO

## 2023-01-12 SDOH — ECONOMIC STABILITY: FOOD INSECURITY: WITHIN THE PAST 12 MONTHS, YOU WORRIED THAT YOUR FOOD WOULD RUN OUT BEFORE YOU GOT MONEY TO BUY MORE.: NEVER TRUE

## 2023-01-12 SDOH — ECONOMIC STABILITY: TRANSPORTATION INSECURITY
IN THE PAST 12 MONTHS, HAS LACK OF TRANSPORTATION KEPT YOU FROM MEETINGS, WORK, OR FROM GETTING THINGS NEEDED FOR DAILY LIVING?: NO

## 2023-01-12 SDOH — ECONOMIC STABILITY: INCOME INSECURITY: IN THE LAST 12 MONTHS, WAS THERE A TIME WHEN YOU WERE NOT ABLE TO PAY THE MORTGAGE OR RENT ON TIME?: NO

## 2023-01-12 SDOH — ECONOMIC STABILITY: FOOD INSECURITY: WITHIN THE PAST 12 MONTHS, THE FOOD YOU BOUGHT JUST DIDN'T LAST AND YOU DIDN'T HAVE MONEY TO GET MORE.: NEVER TRUE

## 2023-01-12 SDOH — ECONOMIC STABILITY: HOUSING INSECURITY
IN THE LAST 12 MONTHS, WAS THERE A TIME WHEN YOU DID NOT HAVE A STEADY PLACE TO SLEEP OR SLEPT IN A SHELTER (INCLUDING NOW)?: NO

## 2023-01-12 ASSESSMENT — PATIENT HEALTH QUESTIONNAIRE - PHQ9
SUM OF ALL RESPONSES TO PHQ QUESTIONS 1-9: 0
2. FEELING DOWN, DEPRESSED OR HOPELESS: 0
SUM OF ALL RESPONSES TO PHQ9 QUESTIONS 1 & 2: 0
1. LITTLE INTEREST OR PLEASURE IN DOING THINGS: 0

## 2023-01-12 ASSESSMENT — SOCIAL DETERMINANTS OF HEALTH (SDOH): HOW HARD IS IT FOR YOU TO PAY FOR THE VERY BASICS LIKE FOOD, HOUSING, MEDICAL CARE, AND HEATING?: NOT VERY HARD

## 2023-01-12 NOTE — PROGRESS NOTES
Chief Complaint   Patient presents with    New Patient     Was in the hospital for pneumonia, flu and  diagnosed with COPD, had EKG in hospital ([facility])  as well as A1C 7.8       HPI:  Patient is here to be established as a new patient  Was recently hospitalized at [facility] because of pneumonia underlying COPD on home oxygen. Patient was a cigarette smoker for the last 30 years 1-1/2 pack a day he quit last month. Patient has a history of diabetes mellitus for the last 14 years takes metformin . Last hemoglobin A1c was 7.6  Blood sugar readings at home are uncontrolled  Patient denied chest pain denied any GI symptoms. He gets short of breath from least exertion walking at the house. Past Medical History, Surgical History, and Family History has been reviewed and updated.     Review of Systems:  Constitutional:  No fever, no fatigue, no chills, no headaches, no weight change  Dermatology:  No rash, no mole, no dry or sensitive skin  ENT:  No cough, no sore throat, no sinus pain, no runny nose, no ear pain  Cardiology:  No chest pain, no palpitations, no leg edema, no shortness of breath, no PND  Gastroenterology:  No dysphagia, no abdominal pain, no nausea, no vomiting, no constipation, no diarrhea, no heartburn  Musculoskeletal:  No joint pain, no leg cramps, no back pain, no muscle aches  Respiratory:  No shortness of breath, no orthopnea, no wheezing, no CELAYA, no hemoptysis  Urology:  No blood in the urine, no urinary frequency, no urinary incontinence, no urinary urgency, no nocturia, no dysuria    Vitals:    01/12/23 1407   BP: 110/72   Site: Right Upper Arm   Position: Sitting   Cuff Size: Medium Adult   Pulse: 81   Temp: 97.1 °F (36.2 °C)   TempSrc: Temporal   SpO2: 98%   Weight: 166 lb 4.8 oz (75.4 kg)   Height: 6' 1.2\" (1.859 m)       General:  Patient alert and oriented x 3, NAD, pleasant  HEENT:  Atraumatic, normocephalic, PERRLA, EOMI, clear conjunctiva, TMs clear, nose-clear, throat - no erythema  Neck:  Supple, no goiter, no carotid bruits, no LAD  Lungs: Decreased air exchange throughout both lung fields scattered wheezes bilaterally no rales resonant to percussion.   Heart:  RRR, no murmurs, gallops or rubs  Abdomen:  Soft/nt/nd, + bowel sounds  Lymph node examination: unremarkable  Neurological exam : unremarkable  Extremities:  No clubbing, cyanosis or edema  Skin: unremarkable    Hemoglobin A1C   Date Value Ref Range Status   12/29/2022 7.8 (H) 4.0 - 5.6 % Final     Cholesterol, Total   Date Value Ref Range Status   12/29/2022 115 0 - 199 mg/dL Final     Triglycerides   Date Value Ref Range Status   12/29/2022 183 (H) 0 - 149 mg/dL Final     HDL   Date Value Ref Range Status   12/29/2022 21 >40 mg/dL Final     LDL Calculated   Date Value Ref Range Status   12/29/2022 57 0 - 99 mg/dL Final     VLDL Cholesterol Calculated   Date Value Ref Range Status   12/29/2022 37 mg/dL Final     Sodium   Date Value Ref Range Status   01/03/2023 133 132 - 146 mmol/L Final     Potassium   Date Value Ref Range Status   01/03/2023 3.6 3.5 - 5.0 mmol/L Final     Chloride   Date Value Ref Range Status   01/03/2023 98 98 - 107 mmol/L Final     CO2   Date Value Ref Range Status   01/03/2023 26 22 - 29 mmol/L Final     BUN   Date Value Ref Range Status   01/03/2023 13 6 - 20 mg/dL Final     Creatinine   Date Value Ref Range Status   01/03/2023 0.6 (L) 0.7 - 1.2 mg/dL Final     Glucose   Date Value Ref Range Status   01/03/2023 176 (H) 74 - 99 mg/dL Final     Calcium   Date Value Ref Range Status   01/03/2023 8.2 (L) 8.6 - 10.2 mg/dL Final     Total Protein   Date Value Ref Range Status   01/03/2023 6.1 (L) 6.4 - 8.3 g/dL Final     Albumin   Date Value Ref Range Status   01/03/2023 2.7 (L) 3.5 - 5.2 g/dL Final     Total Bilirubin   Date Value Ref Range Status   01/03/2023 0.9 0.0 - 1.2 mg/dL Final     Alkaline Phosphatase   Date Value Ref Range Status   01/03/2023 48 40 - 129 U/L Final     AST Date Value Ref Range Status   01/03/2023 24 0 - 39 U/L Final     ALT   Date Value Ref Range Status   01/03/2023 25 0 - 40 U/L Final     Est, Glom Filt Rate   Date Value Ref Range Status   01/03/2023 >60 >=60 mL/min/1.73 Final     Comment:     Pediatric calculator link  [email or web address]_[email or web address]  Effective Oct 3, 2022  These results are not intended for use in patients  <25years of age. eGFR results are calculated without  a race factor using the 2021 CKD-EPI equation. Careful  clinical correlation is recommended, particularly when  comparing to results calculated using previous equations. The CKD-EPI equation is less accurate in patients with  extremes of muscle mass, extra-renal metabolism of  creatinine, excessive creatinine ingestion, or following  therapy that affects renal tubular secretion. XR CHEST PORTABLE    Result Date: 12/31/2022  EXAMINATION: ONE XRAY VIEW OF THE CHEST 12/31/2022 2:17 pm COMPARISON: Chest series from December 28, 2022 HISTORY: ORDERING SYSTEM PROVIDED HISTORY: SOB TECHNOLOGIST PROVIDED HISTORY: Reason for exam:->SOB What reading provider will be dictating this exam?->CRC FINDINGS: Background lung hyperinflation and coarse interstitial markings. Persistent interstitial opacities bilaterally perhaps slightly improved from previous exam.  No pleural effusion or pneumothorax. Atherosclerotic disease in the aortic arch. The remaining cardiomediastinal silhouette appears unremarkable. Osseous and thoracic soft tissue structures demonstrate no acute findings. Persistent but perhaps slightly decreased bilateral interstitial opacities. Lung hyperinflation and coarse interstitial markings. Atherosclerotic disease. XR CHEST PORTABLE    Result Date: 12/28/2022  EXAMINATION: ONE XRAY VIEW OF THE CHEST 12/28/2022 3:52 pm COMPARISON: None.  HISTORY: ORDERING SYSTEM PROVIDED HISTORY: hypoxic TECHNOLOGIST PROVIDED HISTORY: Reason for exam:->hypoxic What reading provider will be dictating this exam?->CRC FINDINGS: Bilateral lung opacities right greater than left. There is no effusion or pneumothorax. The cardiomediastinal silhouette is without acute process. The osseous structures are without acute process. Bilateral lung opacities right greater than left. XR CHEST PORTABLE    Result Date: 12/24/2022  EXAMINATION: ONE XRAY VIEW OF THE CHEST 12/24/2022 4:02 pm COMPARISON: None. HISTORY: ORDERING SYSTEM PROVIDED HISTORY: SOB, fever, cough, syncope TECHNOLOGIST PROVIDED HISTORY: Reason for exam:->SOB, fever, cough, syncope FINDINGS: The lungs are without acute focal process. There is no effusion or pneumothorax. The cardiomediastinal silhouette is without acute process. The osseous structures are without acute process. No acute process. CTA PULMONARY W CONTRAST    Result Date: 12/28/2022  EXAMINATION: CTA OF THE CHEST 12/28/2022 4:11 pm TECHNIQUE: CTA of the chest was performed after the administration of intravenous contrast.  Multiplanar reformatted images are provided for review. MIP images are provided for review. Automated exposure control, iterative reconstruction, and/or weight based adjustment of the mA/kV was utilized to reduce the radiation dose to as low as reasonably achievable. COMPARISON: Chest series from the same day HISTORY: ORDERING SYSTEM PROVIDED HISTORY: hypoxic, concern for pe TECHNOLOGIST PROVIDED HISTORY: Reason for exam:->hypoxic, concern for pe Decision Support Exception - unselect if not a suspected or confirmed emergency medical condition->Emergency Medical Condition (MA) What reading provider will be dictating this exam?->CRC FINDINGS: Pulmonary Arteries: Pulmonary arteries are adequately opacified for evaluation. No evidence of intraluminal filling defect to suggest pulmonary embolism. Main pulmonary artery is normal in caliber. Mediastinum: There are prominent mediastinal and hilar lymph nodes.   AP window lymph node series 301, image 92 measures 14 mm in short axis. Conglomerate lymphadenopathy in the subcarinal region measures approximately 4.8 cm on series 301, image 126. Poorly defined bilateral hilar lymph nodes. Scattered atherosclerotic plaque in the thoracic aorta and branch vessels. No aneurysmal dilatation. Heart chambers are not enlarged. No pericardial effusion. No evidence of right heart strain. Normal course and appearance of the esophagus. Lungs/pleura: Airway is patent. No endobronchial lesions. Diffuse ground-glass opacities with scattered interstitial prominence. No pleural effusion or pneumothorax. Upper Abdomen: Limited images of the upper abdomen are unremarkable. Soft Tissues/Bones: The thyroid gland and remaining soft tissue structures of the neck appear unremarkable. No concerning axillary lymph nodes. The anterior and posterior chest wall is unremarkable. 1.  Severe diffuse ground-glass opacities in both lungs with relative sparing of the bilateral lung apices. The distribution is fairly symmetric. There are scattered areas of interstitial prominence. (The imaging features are most suggestive of acute lung disease in that clinical setting. ARDS could have this appearance.) 2. Prominent mediastinal and hilar lymph nodes. 3.  No evidence of pulmonary embolism. No evidence of right heart strain. 4.  Atherosclerotic disease. 5.  Remainder of the study is as above. RECOMMENDATIONS: Recommend pulmonology consultation. Follow-up chest CT in 10-12 weeks also suggested.         Assessment/Plan:    Acute respiratory failure with hypoxia secondary to pneumonia on top of COPD  Chronic obstructive pulmonary disease  Diabetes mellitus type 2 uncontrolled  History of recent pneumonia      Outpatient Encounter Medications as of 1/12/2023   Medication Sig Dispense Refill    fluticasone-umeclidin-vilant (TRELEGY ELLIPTA) 100-62.5-25 MCG/ACT AEPB inhaler Inhale 1 puff into the lungs daily 1 each 1    empagliflozin (JARDIANCE) 10 MG tablet Take 1 tablet by mouth daily 30 tablet 1    folic acid (FOLVITE) 1 MG tablet Take 1 tablet by mouth daily 30 tablet 1    thiamine 100 MG tablet Take 1 tablet by mouth daily 30 tablet 1    albuterol sulfate HFA (VENTOLIN HFA) 108 (90 Base) MCG/ACT inhaler Inhale 2 puffs into the lungs every 4 hours as needed for Wheezing (Please use it as needed for shortness of breath and wheezing; can use up to every 4 hours) 18 g 1    metFORMIN (GLUCOPHAGE-XR) 500 MG extended release tablet Take 1 tablet by mouth daily (with breakfast) 30 tablet 0    acetaminophen (TYLENOL) 325 MG tablet Take 650 mg by mouth every 6 hours as needed for Pain      ibuprofen (ADVIL;MOTRIN) 200 MG tablet Take 200 mg by mouth every 6 hours as needed for Pain      [DISCONTINUED] ondansetron (ZOFRAN-ODT) 4 MG disintegrating tablet Take 8 mg by mouth every 8 hours as needed for Nausea or Vomiting       No facility-administered encounter medications on file as of 1/12/2023.        Jared was seen today for new patient.    Diagnoses and all orders for this visit:    Acute respiratory failure with hypoxia (HCC)  -     fluticasone-umeclidin-vilant (TRELEGY ELLIPTA) 100-62.5-25 MCG/ACT AEPB inhaler; Inhale 1 puff into the lungs daily  -     CBC with Auto Differential; Future    Chronic obstructive pulmonary disease, unspecified COPD type (HCC)  -     fluticasone-umeclidin-vilant (TRELEGY ELLIPTA) 100-62.5-25 MCG/ACT AEPB inhaler; Inhale 1 puff into the lungs daily  -     CBC with Auto Differential; Future    Type 2 diabetes mellitus without complication, without long-term current use of insulin (HCC)  -     empagliflozin (JARDIANCE) 10 MG tablet; Take 1 tablet by mouth daily  -     Comprehensive Metabolic Panel; Future  -     LIPID PANEL; Future  -     MICROALBUMIN / CREATININE URINE RATIO; Future  -     Amb External Referral To Ophthalmology    Prostate cancer screening  -     PSA Screening; Future      There are no Patient Instructions on file for this visit. On this date 1/12/2023 I have spent 45 minutes reviewing previous notes, test results and face to face with the patient discussing the diagnosis and importance of compliance with the treatment plan as well as documenting on the day of the visit.       Joel Guerrero MD   1/12/23

## 2023-01-12 NOTE — LETTER
[address] [place] [address] PRIMARY CARE  [address] [place] [address] [address] [facility] [address]  Phone: [phone]  Fax: Mt Davey MD        January 12, 2023     Patient: Rosario Fontanez   YOB: 1970   Date of Visit: 1/12/2023       To Whom It May Concern: It is my medical opinion that Maria Del Carmen Zimmerman ,may return to work Februar 12 ,2023. If you have any questions or concerns, please don't hesitate to call.     Sincerely,        Fransisca Simpson MD

## 2023-01-16 ENCOUNTER — COMMUNITY OUTREACH (OUTPATIENT)
Dept: PRIMARY CARE CLINIC | Age: 53
End: 2023-01-16

## 2023-01-19 ENCOUNTER — TELEPHONE (OUTPATIENT)
Dept: PRIMARY CARE CLINIC | Age: 53
End: 2023-01-19

## 2023-01-31 DIAGNOSIS — J44.9 CHRONIC OBSTRUCTIVE PULMONARY DISEASE, UNSPECIFIED COPD TYPE (HCC): ICD-10-CM

## 2023-01-31 DIAGNOSIS — Z12.5 PROSTATE CANCER SCREENING: ICD-10-CM

## 2023-01-31 DIAGNOSIS — J96.01 ACUTE RESPIRATORY FAILURE WITH HYPOXIA (HCC): ICD-10-CM

## 2023-01-31 DIAGNOSIS — E11.9 TYPE 2 DIABETES MELLITUS WITHOUT COMPLICATION, WITHOUT LONG-TERM CURRENT USE OF INSULIN (HCC): ICD-10-CM

## 2023-01-31 LAB
ALBUMIN SERPL-MCNC: 4 G/DL (ref 3.5–5.2)
ALP BLD-CCNC: 64 U/L (ref 40–129)
ALT SERPL-CCNC: 13 U/L (ref 0–40)
ANION GAP SERPL CALCULATED.3IONS-SCNC: 11 MMOL/L (ref 7–16)
AST SERPL-CCNC: 14 U/L (ref 0–39)
BASOPHILS ABSOLUTE: 0.03 E9/L (ref 0–0.2)
BASOPHILS RELATIVE PERCENT: 0.4 % (ref 0–2)
BILIRUB SERPL-MCNC: 0.3 MG/DL (ref 0–1.2)
BUN BLDV-MCNC: 9 MG/DL (ref 6–20)
CALCIUM SERPL-MCNC: 10.3 MG/DL (ref 8.6–10.2)
CHLORIDE BLD-SCNC: 99 MMOL/L (ref 98–107)
CHOLESTEROL, TOTAL: 204 MG/DL (ref 0–199)
CO2: 25 MMOL/L (ref 22–29)
CREAT SERPL-MCNC: 0.6 MG/DL (ref 0.7–1.2)
CREATININE URINE: 76 MG/DL (ref 40–278)
EOSINOPHILS ABSOLUTE: 0.08 E9/L (ref 0.05–0.5)
EOSINOPHILS RELATIVE PERCENT: 1 % (ref 0–6)
GFR SERPL CREATININE-BSD FRML MDRD: >60 ML/MIN/1.73
GLUCOSE BLD-MCNC: 227 MG/DL (ref 74–99)
HCT VFR BLD CALC: 35.2 % (ref 37–54)
HDLC SERPL-MCNC: 39 MG/DL
HEMOGLOBIN: 12.1 G/DL (ref 12.5–16.5)
IMMATURE GRANULOCYTES #: 0.05 E9/L
IMMATURE GRANULOCYTES %: 0.6 % (ref 0–5)
LDL CHOLESTEROL CALCULATED: 137 MG/DL (ref 0–99)
LYMPHOCYTES ABSOLUTE: 3.06 E9/L (ref 1.5–4)
LYMPHOCYTES RELATIVE PERCENT: 39.7 % (ref 20–42)
MCH RBC QN AUTO: 30.1 PG (ref 26–35)
MCHC RBC AUTO-ENTMCNC: 34.4 % (ref 32–34.5)
MCV RBC AUTO: 87.6 FL (ref 80–99.9)
MICROALBUMIN UR-MCNC: 13.7 MG/L
MICROALBUMIN/CREAT UR-RTO: 18 (ref 0–30)
MONOCYTES ABSOLUTE: 0.52 E9/L (ref 0.1–0.95)
MONOCYTES RELATIVE PERCENT: 6.7 % (ref 2–12)
NEUTROPHILS ABSOLUTE: 3.97 E9/L (ref 1.8–7.3)
NEUTROPHILS RELATIVE PERCENT: 51.6 % (ref 43–80)
PDW BLD-RTO: 13.1 FL (ref 11.5–15)
PLATELET # BLD: 235 E9/L (ref 130–450)
PMV BLD AUTO: 9.5 FL (ref 7–12)
POTASSIUM SERPL-SCNC: 5 MMOL/L (ref 3.5–5)
PROSTATE SPECIFIC ANTIGEN: 1.33 NG/ML (ref 0–4)
RBC # BLD: 4.02 E12/L (ref 3.8–5.8)
SODIUM BLD-SCNC: 135 MMOL/L (ref 132–146)
TOTAL PROTEIN: 7.2 G/DL (ref 6.4–8.3)
TRIGL SERPL-MCNC: 139 MG/DL (ref 0–149)
VLDLC SERPL CALC-MCNC: 28 MG/DL
WBC # BLD: 7.7 E9/L (ref 4.5–11.5)

## 2023-02-02 PROBLEM — J10.1 INFLUENZA A: Status: RESOLVED | Noted: 2023-01-03 | Resolved: 2023-02-02

## 2023-02-03 ENCOUNTER — OFFICE VISIT (OUTPATIENT)
Dept: PRIMARY CARE CLINIC | Age: 53
End: 2023-02-03
Payer: COMMERCIAL

## 2023-02-03 VITALS
OXYGEN SATURATION: 94 % | DIASTOLIC BLOOD PRESSURE: 74 MMHG | HEIGHT: 73 IN | SYSTOLIC BLOOD PRESSURE: 122 MMHG | TEMPERATURE: 97.2 F | HEART RATE: 84 BPM | RESPIRATION RATE: 16 BRPM | WEIGHT: 171 LBS | BODY MASS INDEX: 22.66 KG/M2

## 2023-02-03 DIAGNOSIS — E11.9 TYPE 2 DIABETES MELLITUS WITHOUT COMPLICATION, WITHOUT LONG-TERM CURRENT USE OF INSULIN (HCC): ICD-10-CM

## 2023-02-03 DIAGNOSIS — J44.9 CHRONIC OBSTRUCTIVE PULMONARY DISEASE, UNSPECIFIED COPD TYPE (HCC): Primary | ICD-10-CM

## 2023-02-03 DIAGNOSIS — E78.3 HYPERCHYLOMICRONEMIA: ICD-10-CM

## 2023-02-03 DIAGNOSIS — K21.9 GASTROESOPHAGEAL REFLUX DISEASE WITHOUT ESOPHAGITIS: ICD-10-CM

## 2023-02-03 DIAGNOSIS — N52.9 ERECTILE DYSFUNCTION, UNSPECIFIED ERECTILE DYSFUNCTION TYPE: ICD-10-CM

## 2023-02-03 PROCEDURE — 99214 OFFICE O/P EST MOD 30 MIN: CPT | Performed by: INTERNAL MEDICINE

## 2023-02-03 RX ORDER — SILDENAFIL 50 MG/1
100 TABLET, FILM COATED ORAL DAILY PRN
Qty: 10 TABLET | Refills: 0 | Status: SHIPPED | OUTPATIENT
Start: 2023-02-03

## 2023-02-03 RX ORDER — METFORMIN HYDROCHLORIDE 500 MG/1
1000 TABLET, EXTENDED RELEASE ORAL
Qty: 180 TABLET | Refills: 0 | Status: SHIPPED | OUTPATIENT
Start: 2023-02-03

## 2023-02-03 RX ORDER — OMEPRAZOLE 20 MG/1
20 CAPSULE, DELAYED RELEASE ORAL
Qty: 90 CAPSULE | Refills: 1 | Status: SHIPPED | OUTPATIENT
Start: 2023-02-03

## 2023-02-03 NOTE — PROGRESS NOTES
Chief Complaint   Patient presents with    COPD     Is not taking Jardiance as waiting for prior auth from insurance. Unable to afford Trellegy, will try patient assistance program to get cheaper. HPI:  Patient is here for follow-up. Shortness of breath completely subsided pulse oximetry at home in the mid 90s percent without oxygen has not been on oxygen for 4 days  Patient was ambulated in the office for 3 minutes pulse oximetry maintained at 98%    Has occasional hacking cough and recurrent acid reflux uses Tums every night he eats late at night    Patient complains of erectile dysfunction for the last 4 months cannot maintain an erection has used Viagra 100 mg tablet in the past without any side effects. Patient has issues filling his prescriptions Jardiance and Trelegy because of insurance coverage and cost he was given free samples and he submitted an application online to get his medication through a special program stated that he has a card coming in the mail  Patient had lab work showed LDL cholesterol 133  PSA within normal limits  Fasting blood sugar 227    While using Jardiance he notices blood sugar in the mid 120s in the morning and in the 180s in the evening he felt fine. Has not seen his ophthalmologist yet. He ran out of metformin 500 XR daily. Past Medical History, Surgical History, and Family History has been reviewed and updated.     Review of Systems:  Constitutional:  No fever, no fatigue, no chills, no headaches, no weight change  Dermatology:  No rash, no mole, no dry or sensitive skin  ENT:  No cough, no sore throat, no sinus pain, no runny nose, no ear pain  Cardiology:  No chest pain, no palpitations, no leg edema, no shortness of breath, no PND  Gastroenterology:  No dysphagia, no abdominal pain, no nausea, no vomiting, no constipation, no diarrhea, no heartburn  Musculoskeletal:  No joint pain, no leg cramps, no back pain, no muscle aches  Respiratory:  No shortness of breath, no orthopnea, no wheezing, no CELAYA, no hemoptysis  Urology:  No blood in the urine, no urinary frequency, no urinary incontinence, no urinary urgency, no nocturia, no dysuria    Vitals:    02/03/23 0903   BP: 122/74   Site: Left Upper Arm   Position: Sitting   Cuff Size: Medium Adult   Pulse: 84   Resp: 16   Temp: 97.2 °F (36.2 °C)   TempSrc: Temporal   SpO2: 94%   Weight: 171 lb (77.6 kg)   Height: 6' 1\" (1.854 m)       General:  Patient alert and oriented x 3, NAD, pleasant  HEENT:  Atraumatic, normocephalic, PERRLA, EOMI, clear conjunctiva, TMs clear, nose-clear, throat - no erythema  Neck:  Supple, no goiter, no carotid bruits, no LAD  Lungs:  CTA   Heart:  RRR, no murmurs, gallops or rubs  Abdomen:  Soft/nt/nd, + bowel sounds  Lymph node examination: unremarkable  Neurological exam : unremarkable  Extremities:  No clubbing, cyanosis or edema  Skin: unremarkable    Hemoglobin A1C   Date Value Ref Range Status   12/29/2022 7.8 (H) 4.0 - 5.6 % Final     Cholesterol, Total   Date Value Ref Range Status   01/31/2023 204 (H) 0 - 199 mg/dL Final     Triglycerides   Date Value Ref Range Status   01/31/2023 139 0 - 149 mg/dL Final     HDL   Date Value Ref Range Status   01/31/2023 39 >40 mg/dL Final     LDL Calculated   Date Value Ref Range Status   01/31/2023 137 (H) 0 - 99 mg/dL Final     VLDL Cholesterol Calculated   Date Value Ref Range Status   01/31/2023 28 mg/dL Final     Sodium   Date Value Ref Range Status   01/31/2023 135 132 - 146 mmol/L Final     Potassium   Date Value Ref Range Status   01/31/2023 5.0 3.5 - 5.0 mmol/L Final     Chloride   Date Value Ref Range Status   01/31/2023 99 98 - 107 mmol/L Final     CO2   Date Value Ref Range Status   01/31/2023 25 22 - 29 mmol/L Final     BUN   Date Value Ref Range Status   01/31/2023 9 6 - 20 mg/dL Final     Creatinine   Date Value Ref Range Status   01/31/2023 0.6 (L) 0.7 - 1.2 mg/dL Final     Glucose   Date Value Ref Range Status   01/31/2023 227 (H) 74 - 99 mg/dL Final     Calcium   Date Value Ref Range Status   01/31/2023 10.3 (H) 8.6 - 10.2 mg/dL Final     Total Protein   Date Value Ref Range Status   01/31/2023 7.2 6.4 - 8.3 g/dL Final     Albumin   Date Value Ref Range Status   01/31/2023 4.0 3.5 - 5.2 g/dL Final     Total Bilirubin   Date Value Ref Range Status   01/31/2023 0.3 0.0 - 1.2 mg/dL Final     Alkaline Phosphatase   Date Value Ref Range Status   01/31/2023 64 40 - 129 U/L Final     AST   Date Value Ref Range Status   01/31/2023 14 0 - 39 U/L Final     ALT   Date Value Ref Range Status   01/31/2023 13 0 - 40 U/L Final     Est, Glom Filt Rate   Date Value Ref Range Status   01/31/2023 >60 >=60 mL/min/1.73 Final     Comment:     Pediatric calculator link  [email or web address]_[email or web address]  Effective Oct 3, 2022  These results are not intended for use in patients  <25years of age. eGFR results are calculated without  a race factor using the 2021 CKD-EPI equation. Careful  clinical correlation is recommended, particularly when  comparing to results calculated using previous equations. The CKD-EPI equation is less accurate in patients with  extremes of muscle mass, extra-renal metabolism of  creatinine, excessive creatinine ingestion, or following  therapy that affects renal tubular secretion. No results found. Assessment/Plan:    COPD controlled stable patient may go back to work.   Diabetes mellitus type 2 uncontrolled due to noncompliance of medications  Gastroesophageal reflux  Erectile dysfunction    Outpatient Encounter Medications as of 2/3/2023   Medication Sig Dispense Refill    omeprazole (PRILOSEC) 20 MG delayed release capsule Take 1 capsule by mouth every morning (before breakfast) 90 capsule 1    sildenafil (VIAGRA) 50 MG tablet Take 2 tablets by mouth daily as needed for Erectile Dysfunction 10 tablet 0    metFORMIN (GLUCOPHAGE-XR) 500 MG extended release tablet Take 2 tablets by mouth daily (with breakfast) 180 tablet 0    empagliflozin (JARDIANCE) 10 MG tablet Take 1 tablet by mouth daily 30 tablet 1    folic acid (FOLVITE) 1 MG tablet Take 1 tablet by mouth daily 30 tablet 1    thiamine 100 MG tablet Take 1 tablet by mouth daily 30 tablet 1    albuterol sulfate HFA (VENTOLIN HFA) 108 (90 Base) MCG/ACT inhaler Inhale 2 puffs into the lungs every 4 hours as needed for Wheezing (Please use it as needed for shortness of breath and wheezing; can use up to every 4 hours) 18 g 1    acetaminophen (TYLENOL) 325 MG tablet Take 650 mg by mouth every 6 hours as needed for Pain      ibuprofen (ADVIL;MOTRIN) 200 MG tablet Take 200 mg by mouth every 6 hours as needed for Pain      fluticasone-umeclidin-vilant (TRELEGY ELLIPTA) 100-62.5-25 MCG/ACT AEPB inhaler Inhale 1 puff into the lungs daily (Patient not taking: Reported on 2/3/2023) 1 each 1    [DISCONTINUED] empagliflozin (JARDIANCE) 10 MG tablet Take 1 tablet by mouth daily (Patient not taking: Reported on 2/3/2023) 30 tablet 1    [DISCONTINUED] metFORMIN (GLUCOPHAGE-XR) 500 MG extended release tablet Take 1 tablet by mouth daily (with breakfast) 30 tablet 0     No facility-administered encounter medications on file as of 2/3/2023.        Jared was seen today for copd.    Diagnoses and all orders for this visit:    Chronic obstructive pulmonary disease, unspecified COPD type (HCC)  -     Comprehensive Metabolic Panel; Future    Type 2 diabetes mellitus without complication, without long-term current use of insulin ([facility])  -     Comprehensive Metabolic Panel; Future  -     empagliflozin (JARDIANCE) 10 MG tablet; Take 1 tablet by mouth daily    Erectile dysfunction, unspecified erectile dysfunction type  -     sildenafil (VIAGRA) 50 MG tablet; Take 2 tablets by mouth daily as needed for Erectile Dysfunction    Gastroesophageal reflux disease without esophagitis    Hyperchylomicronemia  -     LIPID PANEL; Future    Other orders  -     omeprazole (PRILOSEC) 20 MG delayed  release capsule; Take 1 capsule by mouth every morning (before breakfast)  -     metFORMIN (GLUCOPHAGE-XR) 500 MG extended release tablet; Take 2 tablets by mouth daily (with breakfast)         There are no Patient Instructions on file for this visit. On this date 2/3/2023 I have spent 40 minutes reviewing previous notes, test results and face to face with the patient discussing the diagnosis and importance of compliance with the treatment plan as well as documenting on the day of the visit.       Raegan Andrew MD   2/3/23

## 2024-03-06 ENCOUNTER — OFFICE VISIT (OUTPATIENT)
Dept: PRIMARY CARE CLINIC | Age: 54
End: 2024-03-06
Payer: COMMERCIAL

## 2024-03-06 VITALS
OXYGEN SATURATION: 97 % | WEIGHT: 191.3 LBS | TEMPERATURE: 98.2 F | SYSTOLIC BLOOD PRESSURE: 134 MMHG | HEIGHT: 73 IN | HEART RATE: 65 BPM | DIASTOLIC BLOOD PRESSURE: 74 MMHG | BODY MASS INDEX: 25.35 KG/M2

## 2024-03-06 DIAGNOSIS — E11.9 TYPE 2 DIABETES MELLITUS WITHOUT COMPLICATION, WITHOUT LONG-TERM CURRENT USE OF INSULIN (HCC): Primary | ICD-10-CM

## 2024-03-06 DIAGNOSIS — N52.9 ERECTILE DYSFUNCTION, UNSPECIFIED ERECTILE DYSFUNCTION TYPE: ICD-10-CM

## 2024-03-06 DIAGNOSIS — M65.341 TRIGGER RING FINGER OF RIGHT HAND: ICD-10-CM

## 2024-03-06 DIAGNOSIS — J44.9 CHRONIC OBSTRUCTIVE PULMONARY DISEASE, UNSPECIFIED COPD TYPE (HCC): ICD-10-CM

## 2024-03-06 DIAGNOSIS — K40.90 RIGHT INGUINAL HERNIA: ICD-10-CM

## 2024-03-06 LAB — HBA1C MFR BLD: 9.8 %

## 2024-03-06 PROCEDURE — 83036 HEMOGLOBIN GLYCOSYLATED A1C: CPT | Performed by: INTERNAL MEDICINE

## 2024-03-06 PROCEDURE — 99214 OFFICE O/P EST MOD 30 MIN: CPT | Performed by: INTERNAL MEDICINE

## 2024-03-06 PROCEDURE — 3046F HEMOGLOBIN A1C LEVEL >9.0%: CPT | Performed by: INTERNAL MEDICINE

## 2024-03-06 RX ORDER — METFORMIN HYDROCHLORIDE 500 MG/1
1000 TABLET, EXTENDED RELEASE ORAL
Qty: 180 TABLET | Refills: 0 | Status: SHIPPED | OUTPATIENT
Start: 2024-03-06

## 2024-03-06 RX ORDER — SILDENAFIL 50 MG/1
100 TABLET, FILM COATED ORAL DAILY PRN
Qty: 10 TABLET | Refills: 0 | Status: SHIPPED | OUTPATIENT
Start: 2024-03-06

## 2024-03-06 SDOH — ECONOMIC STABILITY: FOOD INSECURITY: WITHIN THE PAST 12 MONTHS, THE FOOD YOU BOUGHT JUST DIDN'T LAST AND YOU DIDN'T HAVE MONEY TO GET MORE.: NEVER TRUE

## 2024-03-06 SDOH — ECONOMIC STABILITY: INCOME INSECURITY: HOW HARD IS IT FOR YOU TO PAY FOR THE VERY BASICS LIKE FOOD, HOUSING, MEDICAL CARE, AND HEATING?: NOT VERY HARD

## 2024-03-06 SDOH — ECONOMIC STABILITY: FOOD INSECURITY: WITHIN THE PAST 12 MONTHS, YOU WORRIED THAT YOUR FOOD WOULD RUN OUT BEFORE YOU GOT MONEY TO BUY MORE.: NEVER TRUE

## 2024-03-06 ASSESSMENT — PATIENT HEALTH QUESTIONNAIRE - PHQ9
SUM OF ALL RESPONSES TO PHQ QUESTIONS 1-9: 2
SUM OF ALL RESPONSES TO PHQ QUESTIONS 1-9: 2
SUM OF ALL RESPONSES TO PHQ9 QUESTIONS 1 & 2: 2
1. LITTLE INTEREST OR PLEASURE IN DOING THINGS: 1
SUM OF ALL RESPONSES TO PHQ QUESTIONS 1-9: 2
SUM OF ALL RESPONSES TO PHQ QUESTIONS 1-9: 2
2. FEELING DOWN, DEPRESSED OR HOPELESS: 1

## 2024-03-06 NOTE — PROGRESS NOTES
date 3/6/2024 I have spent 35 minutes reviewing previous notes, test results and face to face with the patient discussing the diagnosis and importance of compliance with the treatment plan as well as documenting on the day of the visit.      Re Pollard MD   3/6/24